# Patient Record
Sex: MALE | Race: WHITE | Employment: PART TIME | URBAN - METROPOLITAN AREA
[De-identification: names, ages, dates, MRNs, and addresses within clinical notes are randomized per-mention and may not be internally consistent; named-entity substitution may affect disease eponyms.]

---

## 2021-04-22 ENCOUNTER — APPOINTMENT (OUTPATIENT)
Dept: CT IMAGING | Age: 56
DRG: 065 | End: 2021-04-22

## 2021-04-22 ENCOUNTER — HOSPITAL ENCOUNTER (INPATIENT)
Age: 56
LOS: 1 days | Discharge: HOME | DRG: 065 | End: 2021-04-23
Attending: EMERGENCY MEDICINE | Admitting: INTERNAL MEDICINE

## 2021-04-22 ENCOUNTER — APPOINTMENT (OUTPATIENT)
Dept: MRI IMAGING | Age: 56
DRG: 065 | End: 2021-04-22

## 2021-04-22 ENCOUNTER — APPOINTMENT (OUTPATIENT)
Dept: GENERAL RADIOLOGY | Age: 56
DRG: 065 | End: 2021-04-22

## 2021-04-22 DIAGNOSIS — U07.1 CEREBROVASCULAR ACCIDENT (CVA) ASSOCIATED WITH SEVERE ACUTE RESPIRATORY SYNDROME CORONAVIRUS 2 (SARS-COV-2) INFECTION (HCC): ICD-10-CM

## 2021-04-22 DIAGNOSIS — I63.9 CEREBROVASCULAR ACCIDENT (CVA) ASSOCIATED WITH SEVERE ACUTE RESPIRATORY SYNDROME CORONAVIRUS 2 (SARS-COV-2) INFECTION (HCC): ICD-10-CM

## 2021-04-22 DIAGNOSIS — G45.9 TIA (TRANSIENT ISCHEMIC ATTACK): Primary | ICD-10-CM

## 2021-04-22 DIAGNOSIS — E04.1 THYROID NODULE: ICD-10-CM

## 2021-04-22 DIAGNOSIS — I16.1 HYPERTENSIVE EMERGENCY: ICD-10-CM

## 2021-04-22 DIAGNOSIS — M10.9 GOUTY ARTHRITIS OF RIGHT ANKLE: ICD-10-CM

## 2021-04-22 PROBLEM — I10 HTN (HYPERTENSION): Status: ACTIVE | Noted: 2021-04-22

## 2021-04-22 LAB
A/G RATIO: 1.2 (ref 1.1–2.2)
ALBUMIN SERPL-MCNC: 4.3 G/DL (ref 3.4–5)
ALP BLD-CCNC: 81 U/L (ref 40–129)
ALT SERPL-CCNC: 16 U/L (ref 10–40)
AMPHETAMINE SCREEN, URINE: NORMAL
ANION GAP SERPL CALCULATED.3IONS-SCNC: 10 MMOL/L (ref 3–16)
AST SERPL-CCNC: 13 U/L (ref 15–37)
BARBITURATE SCREEN URINE: NORMAL
BASOPHILS ABSOLUTE: 0 K/UL (ref 0–0.2)
BASOPHILS RELATIVE PERCENT: 0.5 %
BENZODIAZEPINE SCREEN, URINE: NORMAL
BILIRUB SERPL-MCNC: 0.6 MG/DL (ref 0–1)
BILIRUBIN URINE: NEGATIVE
BLOOD, URINE: NEGATIVE
BUN BLDV-MCNC: 11 MG/DL (ref 7–20)
CALCIUM SERPL-MCNC: 9.2 MG/DL (ref 8.3–10.6)
CANNABINOID SCREEN URINE: NORMAL
CHLORIDE BLD-SCNC: 104 MMOL/L (ref 99–110)
CLARITY: CLEAR
CO2: 27 MMOL/L (ref 21–32)
COCAINE METABOLITE SCREEN URINE: NORMAL
COLOR: YELLOW
CREAT SERPL-MCNC: 1 MG/DL (ref 0.9–1.3)
EOSINOPHILS ABSOLUTE: 0.1 K/UL (ref 0–0.6)
EOSINOPHILS RELATIVE PERCENT: 0.8 %
EPITHELIAL CELLS, UA: 0 /HPF (ref 0–5)
GFR AFRICAN AMERICAN: >60
GFR NON-AFRICAN AMERICAN: >60
GLOBULIN: 3.6 G/DL
GLUCOSE BLD-MCNC: 100 MG/DL (ref 70–99)
GLUCOSE BLD-MCNC: 107 MG/DL (ref 70–99)
GLUCOSE URINE: NEGATIVE MG/DL
HCT VFR BLD CALC: 43.6 % (ref 40.5–52.5)
HEMOGLOBIN: 14.7 G/DL (ref 13.5–17.5)
HYALINE CASTS: 1 /LPF (ref 0–8)
INR BLD: 0.99 (ref 0.86–1.14)
KETONES, URINE: NEGATIVE MG/DL
LEUKOCYTE ESTERASE, URINE: NEGATIVE
LYMPHOCYTES ABSOLUTE: 2.3 K/UL (ref 1–5.1)
LYMPHOCYTES RELATIVE PERCENT: 24 %
Lab: NORMAL
MCH RBC QN AUTO: 28.9 PG (ref 26–34)
MCHC RBC AUTO-ENTMCNC: 33.8 G/DL (ref 31–36)
MCV RBC AUTO: 85.4 FL (ref 80–100)
METHADONE SCREEN, URINE: NORMAL
MICROSCOPIC EXAMINATION: NORMAL
MONOCYTES ABSOLUTE: 0.7 K/UL (ref 0–1.3)
MONOCYTES RELATIVE PERCENT: 7.3 %
NEUTROPHILS ABSOLUTE: 6.4 K/UL (ref 1.7–7.7)
NEUTROPHILS RELATIVE PERCENT: 67.4 %
NITRITE, URINE: NEGATIVE
OPIATE SCREEN URINE: NORMAL
OXYCODONE URINE: NORMAL
PDW BLD-RTO: 14.2 % (ref 12.4–15.4)
PERFORMED ON: ABNORMAL
PH UA: 6
PH UA: 7 (ref 5–8)
PHENCYCLIDINE SCREEN URINE: NORMAL
PLATELET # BLD: 309 K/UL (ref 135–450)
PMV BLD AUTO: 7.5 FL (ref 5–10.5)
POTASSIUM REFLEX MAGNESIUM: 3.9 MMOL/L (ref 3.5–5.1)
PROPOXYPHENE SCREEN: NORMAL
PROTEIN UA: NEGATIVE MG/DL
PROTHROMBIN TIME: 11.5 SEC (ref 10–13.2)
RBC # BLD: 5.1 M/UL (ref 4.2–5.9)
RBC UA: 1 /HPF (ref 0–4)
SODIUM BLD-SCNC: 141 MMOL/L (ref 136–145)
SPECIFIC GRAVITY UA: 1.02 (ref 1–1.03)
TOTAL PROTEIN: 7.9 G/DL (ref 6.4–8.2)
TROPONIN: <0.01 NG/ML
TSH REFLEX: 4.14 UIU/ML (ref 0.27–4.2)
URINE TYPE: NORMAL
UROBILINOGEN, URINE: 0.2 E.U./DL
WBC # BLD: 9.5 K/UL (ref 4–11)
WBC UA: 0 /HPF (ref 0–5)

## 2021-04-22 PROCEDURE — 81001 URINALYSIS AUTO W/SCOPE: CPT

## 2021-04-22 PROCEDURE — 93005 ELECTROCARDIOGRAM TRACING: CPT | Performed by: EMERGENCY MEDICINE

## 2021-04-22 PROCEDURE — 2500000003 HC RX 250 WO HCPCS: Performed by: INTERNAL MEDICINE

## 2021-04-22 PROCEDURE — 85610 PROTHROMBIN TIME: CPT

## 2021-04-22 PROCEDURE — G0378 HOSPITAL OBSERVATION PER HR: HCPCS

## 2021-04-22 PROCEDURE — 99285 EMERGENCY DEPT VISIT HI MDM: CPT

## 2021-04-22 PROCEDURE — 70496 CT ANGIOGRAPHY HEAD: CPT

## 2021-04-22 PROCEDURE — 80307 DRUG TEST PRSMV CHEM ANLYZR: CPT

## 2021-04-22 PROCEDURE — 84484 ASSAY OF TROPONIN QUANT: CPT

## 2021-04-22 PROCEDURE — 6360000004 HC RX CONTRAST MEDICATION: Performed by: EMERGENCY MEDICINE

## 2021-04-22 PROCEDURE — 80053 COMPREHEN METABOLIC PANEL: CPT

## 2021-04-22 PROCEDURE — 6360000002 HC RX W HCPCS: Performed by: EMERGENCY MEDICINE

## 2021-04-22 PROCEDURE — 96374 THER/PROPH/DIAG INJ IV PUSH: CPT

## 2021-04-22 PROCEDURE — 71045 X-RAY EXAM CHEST 1 VIEW: CPT

## 2021-04-22 PROCEDURE — 2500000003 HC RX 250 WO HCPCS: Performed by: EMERGENCY MEDICINE

## 2021-04-22 PROCEDURE — 6370000000 HC RX 637 (ALT 250 FOR IP): Performed by: EMERGENCY MEDICINE

## 2021-04-22 PROCEDURE — 84443 ASSAY THYROID STIM HORMONE: CPT

## 2021-04-22 PROCEDURE — 2580000003 HC RX 258: Performed by: INTERNAL MEDICINE

## 2021-04-22 PROCEDURE — 70551 MRI BRAIN STEM W/O DYE: CPT

## 2021-04-22 PROCEDURE — 6360000002 HC RX W HCPCS: Performed by: INTERNAL MEDICINE

## 2021-04-22 PROCEDURE — 70450 CT HEAD/BRAIN W/O DYE: CPT

## 2021-04-22 PROCEDURE — 6370000000 HC RX 637 (ALT 250 FOR IP): Performed by: INTERNAL MEDICINE

## 2021-04-22 PROCEDURE — 6370000000 HC RX 637 (ALT 250 FOR IP): Performed by: NURSE PRACTITIONER

## 2021-04-22 PROCEDURE — 94760 N-INVAS EAR/PLS OXIMETRY 1: CPT

## 2021-04-22 PROCEDURE — 85025 COMPLETE CBC W/AUTO DIFF WBC: CPT

## 2021-04-22 PROCEDURE — 36415 COLL VENOUS BLD VENIPUNCTURE: CPT

## 2021-04-22 RX ORDER — SODIUM CHLORIDE 9 MG/ML
25 INJECTION, SOLUTION INTRAVENOUS PRN
Status: DISCONTINUED | OUTPATIENT
Start: 2021-04-22 | End: 2021-04-23 | Stop reason: HOSPADM

## 2021-04-22 RX ORDER — LABETALOL HYDROCHLORIDE 5 MG/ML
10 INJECTION, SOLUTION INTRAVENOUS EVERY 4 HOURS PRN
Status: DISCONTINUED | OUTPATIENT
Start: 2021-04-22 | End: 2021-04-23 | Stop reason: HOSPADM

## 2021-04-22 RX ORDER — ONDANSETRON 2 MG/ML
4 INJECTION INTRAMUSCULAR; INTRAVENOUS EVERY 6 HOURS PRN
Status: DISCONTINUED | OUTPATIENT
Start: 2021-04-22 | End: 2021-04-23 | Stop reason: HOSPADM

## 2021-04-22 RX ORDER — ATORVASTATIN CALCIUM 80 MG/1
80 TABLET, FILM COATED ORAL NIGHTLY
Status: DISCONTINUED | OUTPATIENT
Start: 2021-04-22 | End: 2021-04-23 | Stop reason: HOSPADM

## 2021-04-22 RX ORDER — AMLODIPINE BESYLATE 5 MG/1
5 TABLET ORAL DAILY
Status: DISCONTINUED | OUTPATIENT
Start: 2021-04-22 | End: 2021-04-23 | Stop reason: HOSPADM

## 2021-04-22 RX ORDER — LABETALOL HYDROCHLORIDE 5 MG/ML
10 INJECTION, SOLUTION INTRAVENOUS EVERY 10 MIN PRN
Status: COMPLETED | OUTPATIENT
Start: 2021-04-22 | End: 2021-04-22

## 2021-04-22 RX ORDER — ASPIRIN 300 MG/1
300 SUPPOSITORY RECTAL DAILY
Status: DISCONTINUED | OUTPATIENT
Start: 2021-04-22 | End: 2021-04-23 | Stop reason: HOSPADM

## 2021-04-22 RX ORDER — ASPIRIN 81 MG/1
81 TABLET ORAL DAILY
Status: DISCONTINUED | OUTPATIENT
Start: 2021-04-22 | End: 2021-04-23 | Stop reason: HOSPADM

## 2021-04-22 RX ORDER — POLYETHYLENE GLYCOL 3350 17 G/17G
17 POWDER, FOR SOLUTION ORAL DAILY PRN
Status: DISCONTINUED | OUTPATIENT
Start: 2021-04-22 | End: 2021-04-23 | Stop reason: HOSPADM

## 2021-04-22 RX ORDER — HYDROCHLOROTHIAZIDE 25 MG/1
25 TABLET ORAL DAILY
Status: DISCONTINUED | OUTPATIENT
Start: 2021-04-22 | End: 2021-04-23 | Stop reason: HOSPADM

## 2021-04-22 RX ORDER — ASPIRIN 81 MG/1
81 TABLET, CHEWABLE ORAL ONCE
Status: COMPLETED | OUTPATIENT
Start: 2021-04-22 | End: 2021-04-22

## 2021-04-22 RX ORDER — INDOMETHACIN 25 MG/1
50 CAPSULE ORAL 2 TIMES DAILY WITH MEALS
Status: DISCONTINUED | OUTPATIENT
Start: 2021-04-22 | End: 2021-04-23 | Stop reason: HOSPADM

## 2021-04-22 RX ORDER — ALLOPURINOL 100 MG/1
100 TABLET ORAL 2 TIMES DAILY
Status: DISCONTINUED | OUTPATIENT
Start: 2021-04-22 | End: 2021-04-23 | Stop reason: HOSPADM

## 2021-04-22 RX ORDER — SODIUM CHLORIDE 0.9 % (FLUSH) 0.9 %
5-40 SYRINGE (ML) INJECTION EVERY 12 HOURS SCHEDULED
Status: DISCONTINUED | OUTPATIENT
Start: 2021-04-22 | End: 2021-04-23 | Stop reason: HOSPADM

## 2021-04-22 RX ORDER — PROMETHAZINE HYDROCHLORIDE 25 MG/1
12.5 TABLET ORAL EVERY 6 HOURS PRN
Status: DISCONTINUED | OUTPATIENT
Start: 2021-04-22 | End: 2021-04-23 | Stop reason: HOSPADM

## 2021-04-22 RX ORDER — SODIUM CHLORIDE 0.9 % (FLUSH) 0.9 %
5-40 SYRINGE (ML) INJECTION PRN
Status: DISCONTINUED | OUTPATIENT
Start: 2021-04-22 | End: 2021-04-23 | Stop reason: HOSPADM

## 2021-04-22 RX ORDER — LANOLIN ALCOHOL/MO/W.PET/CERES
6 CREAM (GRAM) TOPICAL NIGHTLY PRN
Status: DISCONTINUED | OUTPATIENT
Start: 2021-04-22 | End: 2021-04-23 | Stop reason: HOSPADM

## 2021-04-22 RX ORDER — METHYLPREDNISOLONE SODIUM SUCCINATE 40 MG/ML
40 INJECTION, POWDER, LYOPHILIZED, FOR SOLUTION INTRAMUSCULAR; INTRAVENOUS ONCE
Status: COMPLETED | OUTPATIENT
Start: 2021-04-22 | End: 2021-04-22

## 2021-04-22 RX ADMIN — Medication 6 MG: at 23:50

## 2021-04-22 RX ADMIN — METHYLPREDNISOLONE SODIUM SUCCINATE 40 MG: 40 INJECTION, POWDER, FOR SOLUTION INTRAMUSCULAR; INTRAVENOUS at 11:42

## 2021-04-22 RX ADMIN — AMLODIPINE BESYLATE 5 MG: 5 TABLET ORAL at 13:18

## 2021-04-22 RX ADMIN — LABETALOL HYDROCHLORIDE 10 MG: 5 INJECTION INTRAVENOUS at 20:59

## 2021-04-22 RX ADMIN — LABETALOL HYDROCHLORIDE 10 MG: 5 INJECTION, SOLUTION INTRAVENOUS at 10:26

## 2021-04-22 RX ADMIN — ATORVASTATIN CALCIUM 80 MG: 80 TABLET, FILM COATED ORAL at 20:59

## 2021-04-22 RX ADMIN — ENOXAPARIN SODIUM 40 MG: 40 INJECTION SUBCUTANEOUS at 13:19

## 2021-04-22 RX ADMIN — IOPAMIDOL 75 ML: 755 INJECTION, SOLUTION INTRAVENOUS at 09:37

## 2021-04-22 RX ADMIN — HYDROCHLOROTHIAZIDE 25 MG: 25 TABLET ORAL at 13:18

## 2021-04-22 RX ADMIN — ALLOPURINOL 100 MG: 100 TABLET ORAL at 20:59

## 2021-04-22 RX ADMIN — ASPIRIN 81 MG: 81 TABLET, CHEWABLE ORAL at 11:41

## 2021-04-22 RX ADMIN — LABETALOL HYDROCHLORIDE 10 MG: 5 INJECTION, SOLUTION INTRAVENOUS at 10:48

## 2021-04-22 RX ADMIN — INDOMETHACIN 50 MG: 25 CAPSULE ORAL at 15:02

## 2021-04-22 RX ADMIN — Medication 10 ML: at 21:04

## 2021-04-22 RX ADMIN — ALLOPURINOL 100 MG: 100 TABLET ORAL at 15:02

## 2021-04-22 ASSESSMENT — PAIN DESCRIPTION - LOCATION: LOCATION: FOOT

## 2021-04-22 ASSESSMENT — PAIN DESCRIPTION - ORIENTATION: ORIENTATION: RIGHT;LEFT

## 2021-04-22 ASSESSMENT — PAIN DESCRIPTION - PROGRESSION: CLINICAL_PROGRESSION: GRADUALLY IMPROVING

## 2021-04-22 ASSESSMENT — PAIN SCALES - GENERAL
PAINLEVEL_OUTOF10: 0
PAINLEVEL_OUTOF10: 4
PAINLEVEL_OUTOF10: 0

## 2021-04-22 ASSESSMENT — PAIN DESCRIPTION - PAIN TYPE: TYPE: CHRONIC PAIN

## 2021-04-22 ASSESSMENT — PAIN - FUNCTIONAL ASSESSMENT: PAIN_FUNCTIONAL_ASSESSMENT: PREVENTS OR INTERFERES SOME ACTIVE ACTIVITIES AND ADLS

## 2021-04-22 ASSESSMENT — PAIN DESCRIPTION - DESCRIPTORS: DESCRIPTORS: THROBBING;ACHING;CONSTANT

## 2021-04-22 ASSESSMENT — PAIN DESCRIPTION - FREQUENCY: FREQUENCY: CONTINUOUS

## 2021-04-22 NOTE — ED PROVIDER NOTES
CHIEF COMPLAINT  Fatigue (PT c/o generalized weakness and tingling in his left arm since 0745 am. ) and Numbness      HISTORY OF PRESENT ILLNESS  Anna Greenberg is a 55 y.o. male presents to the ED with left side numbness. The patient states it he was in the shower at 745 and he felt really unwell, his left arm was numb and felt heavy. He got dressed, got his kids to school and then came for help. The numbness seems to have gone away, but he still feels somewhat unwell. This is not happened to him before. He has had high blood pressure many times in the past but is untreated. He also notes he is having a gout flare in his right ankle for the last couple of days is been treating that with anti-inflammatories. .  No other complaints, modifying factors or associated symptoms. I have reviewed the following from the nursing documentation. No past medical history on file. No past surgical history on file. No family history on file.   Social History     Socioeconomic History    Marital status: Not on file     Spouse name: Not on file    Number of children: Not on file    Years of education: Not on file    Highest education level: Not on file   Occupational History    Not on file   Social Needs    Financial resource strain: Not on file    Food insecurity     Worry: Not on file     Inability: Not on file    Transportation needs     Medical: Not on file     Non-medical: Not on file   Tobacco Use    Smoking status: Not on file   Substance and Sexual Activity    Alcohol use: Not on file    Drug use: Not on file    Sexual activity: Not on file   Lifestyle    Physical activity     Days per week: Not on file     Minutes per session: Not on file    Stress: Not on file   Relationships    Social connections     Talks on phone: Not on file     Gets together: Not on file     Attends [employer] service: Not on file     Active member of club or organization: Not on file     Attends meetings of clubs or with a rate of 94  Axis is   Left axis deviation  QTc is  484  Intervals and Durations are unremarkable. ST Segments: normal  No prior EKGs      RADIOLOGY  Ct Head Wo Contrast    Result Date: 4/22/2021  EXAMINATION: CT OF THE HEAD WITHOUT CONTRAST  4/22/2021 9:34 am TECHNIQUE: CT of the head was performed without the administration of intravenous contrast. Dose modulation, iterative reconstruction, and/or weight based adjustment of the mA/kV was utilized to reduce the radiation dose to as low as reasonably achievable. COMPARISON: None. HISTORY: ORDERING SYSTEM PROVIDED HISTORY: left sided numbness TECHNOLOGIST PROVIDED HISTORY: Has a \"code stroke\" or \"stroke alert\" been called? ->Yes Reason for exam:->left sided numbness Decision Support Exception->Emergency Medical Condition (MA) Reason for Exam: dizziness Acuity: Acute Type of Exam: Initial FINDINGS: BRAIN/VENTRICLES: There is no acute intracranial hemorrhage, mass effect or midline shift. No abnormal extra-axial fluid collection. There mild periventricular white matter hypodensities. The gray-white differentiation is maintained without evidence of an acute infarct. There is no evidence of hydrocephalus. ORBITS: The visualized portion of the orbits demonstrate no acute abnormality. SINUSES: The visualized paranasal sinuses and mastoid air cells demonstrate no acute abnormality. SOFT TISSUES/SKULL: Multiple soft tissue nodules are seen, some which contain some calcifications. No acute abnormality of the visualized skull or soft tissues. No acute intracranial abnormality. Mild white matter changes consistent with chronic small vessel ischemic disease. Multiple subcutaneous nodules are seen, some which calcifications. While these are nonspecific and may be incidental, neurofibromatosis is not excluded in the correct clinical setting. Findings were discussed with Yazmin Cabrera at 9:49 am on 4/22/2021.      Xr Chest Portable    Result Date: 4/22/2021  EXAMINATION: ONE XRAY VIEW OF THE CHEST 4/22/2021 9:47 am COMPARISON: None available. HISTORY: ORDERING SYSTEM PROVIDED HISTORY: stroke TECHNOLOGIST PROVIDED HISTORY: Reason for exam:->stroke Reason for Exam: stroke Acuity: Acute Type of Exam: Initial FINDINGS: There is left basilar atelectasis. The cardiac silhouette is within normal limits. There is no pneumothorax or pleural effusion. 1.  No acute abnormality. Cta Head Neck W Contrast    Result Date: 4/22/2021  EXAMINATION: CTA OF THE HEAD AND NECK WITH CONTRAST 4/22/2021 9:35 am: TECHNIQUE: CTA of the head and neck was performed with the administration of intravenous contrast. Multiplanar reformatted images are provided for review. MIP images are provided for review. Stenosis of the internal carotid arteries measured using NASCET criteria. Dose modulation, iterative reconstruction, and/or weight based adjustment of the mA/kV was utilized to reduce the radiation dose to as low as reasonably achievable. COMPARISON: None. HISTORY: ORDERING SYSTEM PROVIDED HISTORY: left side numbness TECHNOLOGIST PROVIDED HISTORY: Reason for exam:->left side numbness Initial evaluation. FINDINGS: CTA NECK: AORTIC ARCH/ARCH VESSELS: There is a normal 3 vessel arch configuration. No focal stenosis of the innominate or subclavian arteries. CAROTID ARTERIES: No focal stenosis of the common carotid arteries. There appears to be noncalcified atherosclerosis involving the proximal cervical ICAs, right greater than left. No flow limiting stenosis of the internal carotid arteries by NASCET criteria. There is a retropharyngeal course of the left internal carotid artery. VERTEBRAL ARTERIES: No focal stenosis is seen of the vertebral arteries. SOFT TISSUES: No focal consolidation within the visualized lung apices. No acute abnormality within the visualized superior mediastinum. There is heterogeneity of the thyroid gland bilaterally, which is slightly enlarged.  There

## 2021-04-22 NOTE — H&P
Pulse 84   Temp 98.2 °F (36.8 °C) (Oral)   Resp 16   Ht 5' 11\" (1.803 m)   Wt 251 lb 5.2 oz (114 kg)   SpO2 95%   BMI 35.05 kg/m²     General appearance: No apparent distress appears stated age and cooperative. HEENT Normal cephalic, atraumatic without obvious deformity. Pupils equal, round, and reactive to light. Extra ocular muscles intact. Conjunctivae/corneas clear. Neck: Supple, No jugular venous distention/bruits. Trachea midline without thyromegaly or adenopathy with full range of motion. Lungs: Clear to auscultation, bilaterally without Rales/Wheezes/Rhonchi with good respiratory effort. Heart: Regular rate and rhythm with Normal S1/S2 without murmurs, rubs or gallops, point of maximum impulse non-displaced  Abdomen: Soft, non-tender or non-distended without rigidity or guarding and positive bowel sounds all four quadrants. Extremities: No clubbing, cyanosis, or edema bilaterally. Full range of motion without deformity and normal gait intact. Skin: Skin color, texture, turgor normal.  No rashes or lesions. Neurologic: Alert and oriented X 3, neurovascularly intact with sensory/motor intact upper extremities/lower extremities, bilaterally. Cranial nerves: II-XII intact, grossly non-focal.  Mental status: Alert, oriented, thought content appropriate.   Capillary Refill: Acceptable  < 3 seconds  Peripheral Pulses: +3 Easily felt, not easily obliterated with pressure      CBC   Recent Labs     04/22/21  0931   WBC 9.5   HGB 14.7   HCT 43.6         RENAL  Recent Labs     04/22/21  0931      K 3.9      CO2 27   BUN 11   CREATININE 1.0     LFT'S  Recent Labs     04/22/21  0931   AST 13*   ALT 16   BILITOT 0.6   ALKPHOS 81     COAG  Recent Labs     04/22/21  0931   INR 0.99     CARDIAC ENZYMES  Recent Labs     04/22/21  0931   TROPONINI <0.01       U/A:    Lab Results   Component Value Date    COLORU YELLOW 04/22/2021    CLARITYU Clear 04/22/2021    SPECGRAV 1.023 04/22/2021

## 2021-04-22 NOTE — CARE COORDINATION
INITIAL CASE MANAGEMENT ASSESSMENT    Reviewed chart, met with patient to assess possible discharge needs. Explained Case Management role/services. Living Situation: Lives in a house with 2 VALERY with his children who are 12 and 5, his wife  3 yrs ago, patient concerned on how his children will handle him being in the hospital.  Patient has older children 27 & 29 that will assist with children     ADLs: Independent     DME: None    PT/OT Recs: TBD     Active Services: None     Transportation: active , his car is in the parking lot. Medications: Steven    PCP: had PCP in [place]?, hasn't seen       HD/PD: N/A    PLAN/COMMENTS: Patient is concerned that he doesn't have insurance. Has not seen PCP due to no insurance. May not be able to afford medications, DANIELA provided information on [address]( pt may need voucher at discharge)-   Patient unsure if he can see his PCP- stated last visit was $500.00. He can not afford that. DANIELA called [phone]  Counselor  [phone])requested that she see patient. SW/CM provided contact information for patient or family to call with any questions. SW/CM will follow and assist as needed.     Advanced Care Planning Complered

## 2021-04-22 NOTE — ED NOTES
Provider notified of pt's persistent elevated BP. No new orders at this time.       Wojciech Braxton, RN  04/22/21 [address] [facility] [employer] Matti, RN  04/22/21 [id number]

## 2021-04-22 NOTE — ACP (ADVANCE CARE PLANNING)
Advance Care Planning     Advance Care Planning Activator (Inpatient)  Conversation Note      Date of ACP Conversation: 4/22/2021    Conversation Conducted with: Patient with Decision Making Capacity    ACP Activator: Nneka Barton Maker:     Current Designated Health Care Decision Maker:     Primary Decision Maker: Kayla [facility] - [phone]    Secondary Decision Maker: Raf Krishna Child - [phone]    Today we documented Decision Maker(s) consistent with Legal Next of Kin hierarchy. Care Preferences    Ventilation: \"If you were in your present state of health and suddenly became very ill and were unable to breathe on your own, what would your preference be about the use of a ventilator (breathing machine) if it were available to you? \"      Would the patient desire the use of ventilator (breathing machine)?: yes    \"If your health worsens and it becomes clear that your chance of recovery is unlikely, what would your preference be about the use of a ventilator (breathing machine) if it were available to you? \"     Would the patient desire the use of ventilator (breathing machine)?: Yes      Resuscitation  \"CPR works best to restart the heart when there is a sudden event, like a heart attack, in someone who is otherwise healthy. Unfortunately, CPR does not typically restart the heart for people who have serious health conditions or who are very sick. \"    \"In the event your heart stopped as a result of an underlying serious health condition, would you want attempts to be made to restart your heart (answer \"yes\" for attempt to resuscitate) or would you prefer a natural death (answer \"no\" for do not attempt to resuscitate)? \" yes       [x] Yes   [] No   Educated Patient / Francheska King regarding differences between Advance Directives and portable DNR orders.     Length of ACP Conversation in minutes:  10    Conversation Outcomes:  [x] ACP discussion completed  [] Existing advance directive reviewed with patient; no changes to patient's previously recorded wishes  [] New Advance Directive completed  [] Portable Do Not Rescitate prepared for Provider review and signature  [] POLST/POST/MOLST/MOST prepared for Provider review and signature      Follow-up plan:    [] Schedule follow-up conversation to continue planning  [] Referred individual to Provider for additional questions/concerns   [] Advised patient/agent/surrogate to review completed ACP document and update if needed with changes in condition, patient preferences or care setting    [] This note routed to one or more involved healthcare providers

## 2021-04-22 NOTE — PROGRESS NOTES
Medication Reconciliation    List of medications for Claudia Fulton is currently taking is complete. Source of information:   [employer] with patient, without prompting     Allergies  Patient has no known allergies.      Notes regarding home medications:   Patient denies the use of any routine herbal, OTC, or prescription medications  Will occasionally take aspirin for gout symptoms      Flako Norris, PharmKEELY   4/22/2021 11:48 AM

## 2021-04-22 NOTE — ED NOTES
Report called to RN assuming care. Pain score reviewed and all questions answered. Will take pt upstairs and go over [facility] at bedside.       Teja Posada RN  04/22/21 7317

## 2021-04-22 NOTE — ED TRIAGE NOTES
Pt arrived to dept via private vehicle. Pt c/o generalized weakness/dizziness with left arm tingling starting at 0745 this morning. Pt reports that the left arm tingling is no longer present. Pt awake, alert and oriented x 3. Skin warm and dry/normal color for ethnicity. Resp easy and unlabored. Pt placed in gown and on cardiac monitor. Call light in reach. Will continue to monitor.

## 2021-04-23 VITALS
SYSTOLIC BLOOD PRESSURE: 162 MMHG | DIASTOLIC BLOOD PRESSURE: 88 MMHG | WEIGHT: 245.59 LBS | HEIGHT: 71 IN | OXYGEN SATURATION: 94 % | HEART RATE: 92 BPM | BODY MASS INDEX: 34.38 KG/M2 | RESPIRATION RATE: 16 BRPM | TEMPERATURE: 98 F

## 2021-04-23 PROBLEM — I63.9 CEREBROVASCULAR ACCIDENT (CVA) ASSOCIATED WITH SEVERE ACUTE RESPIRATORY SYNDROME CORONAVIRUS 2 (SARS-COV-2) INFECTION (HCC): Status: ACTIVE | Noted: 2021-04-23

## 2021-04-23 PROBLEM — U07.1 CEREBROVASCULAR ACCIDENT (CVA) ASSOCIATED WITH SEVERE ACUTE RESPIRATORY SYNDROME CORONAVIRUS 2 (SARS-COV-2) INFECTION (HCC): Status: ACTIVE | Noted: 2021-04-23

## 2021-04-23 LAB
CHOLESTEROL, TOTAL: 193 MG/DL (ref 0–199)
EKG ATRIAL RATE: 94 BPM
EKG DIAGNOSIS: NORMAL
EKG P AXIS: 41 DEGREES
EKG P-R INTERVAL: 148 MS
EKG Q-T INTERVAL: 390 MS
EKG QRS DURATION: 98 MS
EKG QTC CALCULATION (BAZETT): 487 MS
EKG R AXIS: -40 DEGREES
EKG T AXIS: 43 DEGREES
EKG VENTRICULAR RATE: 94 BPM
ESTIMATED AVERAGE GLUCOSE: 105.4 MG/DL
HBA1C MFR BLD: 5.3 %
HCT VFR BLD CALC: 42 % (ref 40.5–52.5)
HDLC SERPL-MCNC: 36 MG/DL (ref 40–60)
HEMOGLOBIN: 14.6 G/DL (ref 13.5–17.5)
LDL CHOLESTEROL CALCULATED: 138 MG/DL
LV EF: 53 %
LVEF MODALITY: NORMAL
MCH RBC QN AUTO: 29.3 PG (ref 26–34)
MCHC RBC AUTO-ENTMCNC: 34.8 G/DL (ref 31–36)
MCV RBC AUTO: 84.3 FL (ref 80–100)
PDW BLD-RTO: 14 % (ref 12.4–15.4)
PLATELET # BLD: 318 K/UL (ref 135–450)
PMV BLD AUTO: 7.1 FL (ref 5–10.5)
RBC # BLD: 4.98 M/UL (ref 4.2–5.9)
TRIGL SERPL-MCNC: 93 MG/DL (ref 0–150)
VLDLC SERPL CALC-MCNC: 19 MG/DL
WBC # BLD: 11.3 K/UL (ref 4–11)

## 2021-04-23 PROCEDURE — 94760 N-INVAS EAR/PLS OXIMETRY 1: CPT

## 2021-04-23 PROCEDURE — 85027 COMPLETE CBC AUTOMATED: CPT

## 2021-04-23 PROCEDURE — 6370000000 HC RX 637 (ALT 250 FOR IP): Performed by: INTERNAL MEDICINE

## 2021-04-23 PROCEDURE — 83036 HEMOGLOBIN GLYCOSYLATED A1C: CPT

## 2021-04-23 PROCEDURE — 36415 COLL VENOUS BLD VENIPUNCTURE: CPT

## 2021-04-23 PROCEDURE — 6360000002 HC RX W HCPCS: Performed by: INTERNAL MEDICINE

## 2021-04-23 PROCEDURE — 2060000000 HC ICU INTERMEDIATE R&B

## 2021-04-23 PROCEDURE — 2580000003 HC RX 258: Performed by: INTERNAL MEDICINE

## 2021-04-23 PROCEDURE — 80061 LIPID PANEL: CPT

## 2021-04-23 PROCEDURE — 93010 ELECTROCARDIOGRAM REPORT: CPT | Performed by: INTERNAL MEDICINE

## 2021-04-23 PROCEDURE — 93306 TTE W/DOPPLER COMPLETE: CPT

## 2021-04-23 RX ORDER — ALLOPURINOL 100 MG/1
100 TABLET ORAL DAILY
Qty: 30 TABLET | Refills: 3 | Status: SHIPPED | OUTPATIENT
Start: 2021-04-23 | End: 2021-11-05 | Stop reason: SDUPTHER

## 2021-04-23 RX ORDER — CLOPIDOGREL BISULFATE 75 MG/1
75 TABLET ORAL DAILY
Qty: 30 TABLET | Refills: 3 | Status: SHIPPED | OUTPATIENT
Start: 2021-04-23 | End: 2021-11-05 | Stop reason: SDUPTHER

## 2021-04-23 RX ORDER — HYDROCHLOROTHIAZIDE 25 MG/1
25 TABLET ORAL DAILY
Qty: 30 TABLET | Refills: 3 | Status: SHIPPED | OUTPATIENT
Start: 2021-04-24 | End: 2021-11-05 | Stop reason: SDUPTHER

## 2021-04-23 RX ORDER — ASPIRIN 81 MG/1
81 TABLET ORAL DAILY
Qty: 30 TABLET | Refills: 0 | Status: SHIPPED | OUTPATIENT
Start: 2021-04-24 | End: 2021-11-05 | Stop reason: ALTCHOICE

## 2021-04-23 RX ORDER — ATORVASTATIN CALCIUM 80 MG/1
80 TABLET, FILM COATED ORAL NIGHTLY
Qty: 30 TABLET | Refills: 3 | Status: SHIPPED | OUTPATIENT
Start: 2021-04-23 | End: 2021-11-05 | Stop reason: SDUPTHER

## 2021-04-23 RX ORDER — AMLODIPINE BESYLATE 5 MG/1
5 TABLET ORAL DAILY
Qty: 30 TABLET | Refills: 3 | Status: SHIPPED | OUTPATIENT
Start: 2021-04-24 | End: 2021-11-05 | Stop reason: SDUPTHER

## 2021-04-23 RX ORDER — INDOMETHACIN 50 MG/1
50 CAPSULE ORAL 2 TIMES DAILY WITH MEALS
Qty: 20 CAPSULE | Refills: 1 | Status: SHIPPED | OUTPATIENT
Start: 2021-04-23 | End: 2021-11-05 | Stop reason: ALTCHOICE

## 2021-04-23 RX ADMIN — Medication 10 ML: at 09:28

## 2021-04-23 RX ADMIN — ASPIRIN 81 MG: 81 TABLET, COATED ORAL at 09:26

## 2021-04-23 RX ADMIN — HYDROCHLOROTHIAZIDE 25 MG: 25 TABLET ORAL at 09:26

## 2021-04-23 RX ADMIN — AMLODIPINE BESYLATE 5 MG: 5 TABLET ORAL at 09:26

## 2021-04-23 RX ADMIN — INDOMETHACIN 50 MG: 25 CAPSULE ORAL at 09:26

## 2021-04-23 RX ADMIN — ALLOPURINOL 100 MG: 100 TABLET ORAL at 09:26

## 2021-04-23 RX ADMIN — ENOXAPARIN SODIUM 40 MG: 40 INJECTION SUBCUTANEOUS at 09:27

## 2021-04-23 ASSESSMENT — PAIN SCALES - GENERAL: PAINLEVEL_OUTOF10: 0

## 2021-04-23 NOTE — PLAN OF CARE
Problem: Falls - Risk of:  Goal: Will remain free from falls  Description: Will remain free from falls  4/23/2021 1115 by William Quiñones RN  Outcome: Ongoing  Note: Fall precautions in place. Bed locked and in lowest position. Call light and belongings within reach. Room kept free of clutter.    4/23/2021 0116 by Jessica Martines RN  Outcome: Ongoing  Goal: Absence of physical injury  Description: Absence of physical injury  4/23/2021 1115 by William Quiñones RN  Outcome: Ongoing  4/23/2021 0116 by Jessica Martines RN  Outcome: Ongoing     Problem: HEMODYNAMIC STATUS  Goal: Patient has stable vital signs and fluid balance  4/23/2021 1115 by William Quiñones RN  Outcome: Ongoing  4/23/2021 0116 by Jessica Martines RN  Outcome: Ongoing     Problem: ACTIVITY INTOLERANCE/IMPAIRED MOBILITY  Goal: Mobility/activity is maintained at optimum level for patient  4/23/2021 1115 by William Quiñones RN  Outcome: Ongoing  4/23/2021 0116 by Jessica Martines RN  Outcome: Ongoing     Problem: COMMUNICATION IMPAIRMENT  Goal: Ability to express needs and understand communication  4/23/2021 1115 by William Quiñones RN  Outcome: Ongoing  4/23/2021 0116 by Jessica Martines RN  Outcome: Ongoing     Problem: Pain:  Goal: Pain level will decrease  Description: Pain level will decrease  4/23/2021 1115 by William Quiñones RN  Outcome: Ongoing  4/23/2021 0116 by Jessica Martines RN  Outcome: Ongoing  Goal: Control of acute pain  Description: Control of acute pain  4/23/2021 1115 by William Quiñones RN  Outcome: Ongoing  4/23/2021 0116 by Jessica Martines RN  Outcome: Ongoing  Goal: Control of chronic pain  Description: Control of chronic pain  4/23/2021 1115 by William Quiñones RN  Outcome: Ongoing  4/23/2021 0116 by Jessica Martines RN  Outcome: Ongoing

## 2021-04-23 NOTE — PROGRESS NOTES
NAME:  Ji Veloz  YOB: 1965  MEDICAL RECORD NUMBER:  [id number]  TODAYS DATE:  4/22/2021    Discussed personal risk factors for Stroke /TIA with patient/family, and ways to reduce the risk for a recurrent stroke. Patient's personal risk factors which were identified are:     [] Alcohol Abuse: check with your physician before any alcohol consumption. [] Atrial fibrillation: may cause blood clots. [] Drug Abuse: Seek help, talk with your doctor  [] Clotting Disorder  [] Diabetes  [x] Family history of stroke or heart disease  [x] High Blood Pressure/Hypertension: work with your physician.  [] High cholesterol: monitor cholesterol levels with your physician.   [] Overweight/Obesity: work with your physician for your ideal body weight.  [] Physical Inactivity: get regular exercise as directed by your physician. [] Personal history of previous TIA or stroke  [x] Poor Diet; decrease salt (sodium) in your diet, follow diet directed by physician. [] Smoking: Cigarette/Cigar: stop smoking. Advised pt. that you can reduce your risk for stroke/TIA by modifying/controlling the risk factors that you have. Pt.advised to take the medications as prescribed, which will be detailed in the discharge instructions, and to not stop taking them without consulting their physician. In addition, pt. advised to maintain a healthy diet, exercise regularly and to not smoke. [facility]'s Stroke treatment and prevention, Managing your recovery  notebook  provided and/or reviewed  with patient/family. The notebook includes, but not limited to, sections addressing warning signs & symptoms of a stroke, which are: sudden numbness or weakness especially on one side of the body, sudden confusion, difficulty speaking or understanding, sudden changes in vision, sudden dizziness or loss of balance/ coordination, or sudden severe headache.   The need to call EMS (911) immediately if signs & symptoms occur is emphasized . The notebook also provides education on Stroke community resources and stroke advocacy. The need for follow-up after discharge was highlighted with patient/family with them being able to repeat understanding of the importance of this.       Electronically signed by Daniel Hogue RN on 4/22/2021 at 1600

## 2021-04-23 NOTE — PROGRESS NOTES
[facility] [place]  Personalized Stroke Treatment Plan  My Stroke Type:   [] Ischemic Stroke (Blockage of blood flow to the brain)     [] TIA  Transient Ischemic Attack (mini-stroke)    Personal risk factors you can control include:    [] Alcohol Abuse: check with your physician before any alcohol consumption. [] Atrial fibrillation: may cause blood clots. [] Drug Abuse: Seek help, talk with your doctor  [] Clotting Disorder  [] Diabetes  [] Family history of stroke or heart disease  [x] High Blood Pressure/Hypertension: work with your physician.  [] High cholesterol: monitor cholesterol levels with your physician.   [] Overweight/Obesity: work with your physician for your ideal body weight.  [] Physical Inactivity: get regular exercise as directed by your physician. [] Personal history of previous TIA or stroke  [x] Poor Diet; decrease salt (sodium) in your diet, follow diet directed by physician. [] Smoking: Cigarette/Cigar: stop smoking. Follow up with your physician is important after discharge. TAKE all medications as prescribed. Do not stop taking any medications   without talking to your physician. BE FAST is a simple way to remember the main symptoms of stroke. Recognizing these symptoms helps you know when to call for medical help. BE FAST stands for:                                                 B Balance problems                                                 E Eyes, vision lost        F  Face Drooping      A  Arm Weakness        S  Speech Difficulty      T  Time to Call 9-1-1  DO NOT DELAY THIS! Educated patient and/or family on personal risk factors for stroke/TIA. Included ways to reduce the risk for recurrent stroke. [facility] Stroke treatment and prevention, Managing your recovery  notebook  provided to patient. The notebook includes, but not limited to, sections addressing warning signs & symptoms of a stroke.  The need to call EMS (911) immediately if signs & symptoms occur is emphasized . Medication information will be reviewed at discharge. The notebook also provides education on Stroke community resources and stroke advocacy.     Electronically signed by Electronically signed by Jamal Alexander RN on 4/23/2021 at 11:17 AM

## 2021-04-23 NOTE — PROGRESS NOTES
4 Eyes Skin Assessment     NAME:  Miguel Mazariegos  YOB: 1965  MEDICAL RECORD NUMBER:  [id number]    The patient is being assess for  Admission    I agree that 2 RN's have performed a thorough Head to Toe Skin Assessment on the patient. ALL assessment sites listed below have been assessed. Areas assessed by both nurses:    Head, Face, Ears, Shoulders, Back, Chest, Arms, Elbows, Hands, Sacrum. Buttock, Coccyx, Ischium and Legs. Feet and Heels        Does the Patient have a Wound?  No noted wound(s)       Srinath Prevention initiated:  NA   Wound Care Orders initiated:  NA    Pressure Injury (Stage 3,4, Unstageable, DTI, NWPT, and Complex wounds) if present place consult order under [de-identified] NA    New and Established Ostomies if present place consult order under : NA      Nurse 1 eSignature: Electronically signed by Olivia Felder RN on 4/22/21 at 8:58 PM EDT    **SHARE this note so that the co-signing nurse is able to place an eSignature**    Nurse 2 eSignature: {Esignature:[id number]}

## 2021-04-23 NOTE — DISCHARGE SUMMARY
Hospital Medicine Discharge Summary    Patient ID: Erica Dumont      Patient's PCP: No primary care provider on file. Admit Date: 4/22/2021     Discharge Date:   4/23/2021    Admitting Physician: Mendoza Thorpe MD     Discharge Physician: Mendoza Thorpe MD     Discharge Diagnoses: Active Hospital Problems    Diagnosis    Cerebrovascular accident (CVA) associated with severe acute respiratory syndrome coronavirus 2 (SARS-CoV-2) infection (HCC) [U07.1, I63.9]    HTN (hypertension) [I10]       The patient was seen and examined on day of discharge and this discharge summary is in conjunction with any daily progress note from day of discharge. Hospital Course: The patient is a 55 y.o. male w Hx of gout, HTN, has not followed up with PCP or taken any BP meds in the past several years, usually active and works construction, who presents to [facility] with several complaints.      Pt reports he got up feeling well this morning. He spend some time in the bath tub and as he was transferring to the shower, became very lightheaded and experienced tingling sensation in his left arm. Pt denies vertigo. More of lightheaded feeling. He did not think he will pass out, but it was severe enough that he had to sit down.      As symptoms improved, he drove his children to school and then came to ED for evaluation. Acute Ischemic CVA R internal capsule. Sensation of Lightheadedness and tingling sensation left arm. Plan:               - CT head unremarkable. - CTA head without major ICA occlusion.               - MRI brain with R internal capsule area of acute ischemic infarct. - start ASA and lipitor.    - add plavix - plan for DAPT for 30 days - then continue with plavix alone with severe V4 stenosis of bilateral vertebral arteries.         HTN Urgency - resolving. Long standing uncontrolled HTN - lost to follow up as of 2016. Start amlodipine and HCTZ. Will need renal panel in 1-2 weeks post starting HCTZ  Thyroid studies - unremarkable.         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 As above. Plan for long term plavix and statin.          2.4cm Thyroid Nodule. Check TSH - normal.    Plan for OP thyroid US and NM iodine uptake scan. This will need follow up - with normal TSH he may need FNA pending US and CONDE uptake scan.            Gout - acute flair now much improved. Right ankle. Treat with indomethacin and allopurinol.                 Physical Exam Performed:     BP (!) 162/88   Pulse 92   Temp 98 °F (36.7 °C) (Oral)   Resp 16   Ht 5' 11\" (1.803 m)   Wt 245 lb 9.5 oz (111.4 kg)   SpO2 94%   BMI 34.25 kg/m²       General appearance:  No apparent distress, appears stated age and cooperative. HEENT:  Normal cephalic, atraumatic without obvious deformity. Pupils equal, round, and reactive to light. Extra ocular muscles intact. Conjunctivae/corneas clear. Neck: Supple, with full range of motion. No jugular venous distention. Trachea midline. Respiratory:  Normal respiratory effort. Clear to auscultation, bilaterally without Rales/Wheezes/Rhonchi. Cardiovascular:  Regular rate and rhythm with normal S1/S2 without murmurs, rubs or gallops. Abdomen: Soft, non-tender, non-distended with normal bowel sounds. Musculoskeletal:  No clubbing, cyanosis or edema bilaterally. Full range of motion without deformity. Skin: Skin color, texture, turgor normal.  No rashes or lesions. Neurologic:  Neurovascularly intact without any focal sensory/motor deficits. Cranial nerves: II-XII intact, grossly non-focal.  Psychiatric:  Alert and oriented, thought content appropriate, normal insight  Capillary Refill: Brisk,< 3 seconds   Peripheral Pulses: +2 palpable, equal bilaterally       Labs:  For convenience and continuity at follow-up the following most recent labs are provided:      CBC:    Lab Results   Component Value Date    WBC 11.3 04/23/2021    HGB 14.6 04/23/2021    HCT 42.0 04/23/2021     04/23/2021       Renal:    Lab Results   Component Value Date     04/22/2021    K 3.9 04/22/2021     04/22/2021    CO2 27 04/22/2021    BUN 11 04/22/2021    CREATININE 1.0 04/22/2021    CALCIUM 9.2 04/22/2021         Significant Diagnostic Studies    Radiology:   MRI brain without contrast   Preliminary Result   There is a 7 mm area of restricted diffusion in the posterior limb of the   right internal capsule consistent with an acute area of infarct. No other   areas of restricted diffusion are identified. Mild chronic small vessel ischemic changes. XR CHEST PORTABLE   Final Result   1. No acute abnormality. CTA HEAD NECK W CONTRAST   Final Result   1. Atherosclerosis contributes to moderate to severe stenosis involving the   V4 segments of the vertebral arteries bilaterally. 2. No focal stenosis otherwise seen of the [id number]. 3. No flow limiting stenosis of the cervical carotid/vertebral arteries. 4. Heterogeneous slightly enlarged appearance of the thyroid gland   bilaterally with a left thyroid nodule measuring up to 2.4 cm. Recommend   nonemergent thyroid ultrasound if not recently performed. RECOMMENDATIONS:   2.4 cm incidental left thyroid nodule with heterogeneous and enlarged   thyroid. Recommend thyroid US. Reference: J Am Brian Radiol. 2015 Feb;12(2): 143-50         CT HEAD WO CONTRAST   Final Result   No acute intracranial abnormality. Mild white matter changes consistent with chronic small vessel ischemic   disease. Multiple subcutaneous nodules are seen, some which calcifications. While   these are nonspecific and may be incidental, neurofibromatosis is not   excluded in the correct clinical setting. Findings were discussed with Galo Lynn at 9:49 am on 4/22/2021.                 Consults:     IP CONSULT TO STROKE TEAM  IP CONSULT TO NEUROLOGY Disposition:  home    Condition at Discharge: Stable    Discharge Instructions/Follow-up:  PCP 1 week. Code Status:  Full Code     Activity: activity as tolerated    Diet: regular diet      Discharge Medications:     Current Discharge Medication List           Details   aspirin 81 MG EC tablet Take 1 tablet by mouth daily  Qty: 30 tablet, Refills: 0      indomethacin (INDOCIN) 50 MG capsule Take 1 capsule by mouth 2 times daily (with meals) for 10 days  Qty: 20 capsule, Refills: 1      atorvastatin (LIPITOR) 80 MG tablet Take 1 tablet by mouth nightly  Qty: 30 tablet, Refills: 3      amLODIPine (NORVASC) 5 MG tablet Take 1 tablet by mouth daily  Qty: 30 tablet, Refills: 3      hydroCHLOROthiazide (HYDRODIURIL) 25 MG tablet Take 1 tablet by mouth daily  Qty: 30 tablet, Refills: 3      allopurinol (ZYLOPRIM) 100 MG tablet Take 1 tablet by mouth daily  Qty: 30 tablet, Refills: 3      clopidogrel (PLAVIX) 75 MG tablet Take 1 tablet by mouth daily  Qty: 30 tablet, Refills: 3             Time Spent on discharge is more than 30 minutes in the examination, evaluation, counseling and review of medications and discharge plan. Signed:    Prisca Santo MD   4/23/2021      Thank you No primary care provider on file. for the opportunity to be involved in this patient's care. If you have any questions or concerns please feel free to contact me at [phone].

## 2021-04-23 NOTE — CARE COORDINATION
CM follow up. [place] at [place]. Patient independent in care. Patient without PCP, [facility] and [facility] list given- patient will make his own f/u appointment. Patient given Valerie [facility] coupon card and referral to [facility] for further medication assistance done by myself. Patient is eligible for samantha meds at discharge. Messaged Dr Garland Schulte to send meds to retail pharmacy before 3pm today.   Patient to f/u with [facility] for financial/Medicaid assist.  Electronically signed by Pippa Erwin RN Case Management [phone] on 4/23/2021 at 1:56 PM

## 2021-04-23 NOTE — PROGRESS NOTES
NAME:  Lena Ling  YOB: 1965  MEDICAL RECORD NUMBER:  [id number]  TODAYS DATE:  4/23/2021    Discussed personal risk factors for Stroke /TIA with patient/family, and ways to reduce the risk for a recurrent stroke. Patient's personal risk factors which were identified are:     [] Alcohol Abuse: check with your physician before any alcohol consumption. [] Atrial fibrillation: may cause blood clots. [] Drug Abuse: Seek help, talk with your doctor  [] Clotting Disorder  [] Diabetes  [x] Family history of stroke or heart disease  [x] High Blood Pressure/Hypertension: work with your physician.  [] High cholesterol: monitor cholesterol levels with your physician.   [] Overweight/Obesity: work with your physician for your ideal body weight.  [] Physical Inactivity: get regular exercise as directed by your physician. [] Personal history of previous TIA or stroke  [x] Poor Diet; decrease salt (sodium) in your diet, follow diet directed by physician. [] Smoking: Cigarette/Cigar: stop smoking. Advised pt. that you can reduce your risk for stroke/TIA by modifying/controlling the risk factors that you have. Pt.advised to take the medications as prescribed, which will be detailed in the discharge instructions, and to not stop taking them without consulting their physician. In addition, pt. advised to maintain a healthy diet, exercise regularly and to not smoke. [facility]'s Stroke treatment and prevention, Managing your recovery  notebook  provided and/or reviewed  with patient/family. The notebook includes, but not limited to, sections addressing warning signs & symptoms of a stroke, which are: sudden numbness or weakness especially on one side of the body, sudden confusion, difficulty speaking or understanding, sudden changes in vision, sudden dizziness or loss of balance/ coordination, or sudden severe headache.   The need to call EMS (911) immediately if signs & symptoms occur is emphasized . The notebook also provides education on Stroke community resources and stroke advocacy. The need for follow-up after discharge was highlighted with patient/family with them being able to repeat understanding of the importance of this.       Electronically signed by Jaleel Nunez RN on 4/23/2021 at 6:56 AM

## 2021-04-23 NOTE — PLAN OF CARE
Problem: Falls - Risk of:  Goal: Will remain free from falls  Description: Will remain free from falls  4/23/2021 0116 by Jessica Martines RN  Outcome: Ongoing     Problem: Falls - Risk of:  Goal: Absence of physical injury  Description: Absence of physical injury  4/23/2021 0116 by Jessica Martines RN  Outcome: Ongoing     Problem: HEMODYNAMIC STATUS  Goal: Patient has stable vital signs and fluid balance  4/23/2021 0116 by Jessica Martines RN  Outcome: Ongoing     Problem: ACTIVITY INTOLERANCE/IMPAIRED MOBILITY  Goal: Mobility/activity is maintained at optimum level for patient  4/23/2021 0116 by Jessica Martines RN  Outcome: Ongoing     Problem: COMMUNICATION IMPAIRMENT  Goal: Ability to express needs and understand communication  4/23/2021 0116 by Jessica Martines RN  Outcome: Ongoing     Problem: Pain:  Goal: Pain level will decrease  Description: Pain level will decrease  Outcome: Ongoing     Problem: Pain:  Goal: Control of acute pain  Description: Control of acute pain  Outcome: Ongoing     Problem: Pain:  Goal: Control of chronic pain  Description: Control of chronic pain  Outcome: Ongoing

## 2021-11-05 ENCOUNTER — OFFICE VISIT (OUTPATIENT)
Dept: INTERNAL MEDICINE CLINIC | Age: 56
End: 2021-11-05
Payer: MEDICARE

## 2021-11-05 VITALS
HEART RATE: 74 BPM | HEIGHT: 71 IN | BODY MASS INDEX: 33.8 KG/M2 | WEIGHT: 241.4 LBS | DIASTOLIC BLOOD PRESSURE: 82 MMHG | OXYGEN SATURATION: 97 % | SYSTOLIC BLOOD PRESSURE: 138 MMHG

## 2021-11-05 DIAGNOSIS — Z12.11 COLON CANCER SCREENING: ICD-10-CM

## 2021-11-05 DIAGNOSIS — Z12.5 PROSTATE CANCER SCREENING: ICD-10-CM

## 2021-11-05 DIAGNOSIS — Z11.59 NEED FOR HEPATITIS C SCREENING TEST: ICD-10-CM

## 2021-11-05 DIAGNOSIS — E04.1 THYROID NODULE: Primary | ICD-10-CM

## 2021-11-05 DIAGNOSIS — Z00.00 PREVENTATIVE HEALTH CARE: ICD-10-CM

## 2021-11-05 DIAGNOSIS — Z11.4 SCREENING FOR HUMAN IMMUNODEFICIENCY VIRUS WITHOUT PRESENCE OF RISK FACTORS: ICD-10-CM

## 2021-11-05 DIAGNOSIS — I10 PRIMARY HYPERTENSION: ICD-10-CM

## 2021-11-05 DIAGNOSIS — Z76.89 ENCOUNTER TO ESTABLISH CARE: ICD-10-CM

## 2021-11-05 DIAGNOSIS — M10.9 GOUT, UNSPECIFIED CAUSE, UNSPECIFIED CHRONICITY, UNSPECIFIED SITE: ICD-10-CM

## 2021-11-05 DIAGNOSIS — E78.2 MIXED HYPERLIPIDEMIA: ICD-10-CM

## 2021-11-05 DIAGNOSIS — I65.03 VERTEBRAL ARTERY STENOSIS, BILATERAL: ICD-10-CM

## 2021-11-05 DIAGNOSIS — I63.89 CEREBROVASCULAR ACCIDENT (CVA) DUE TO OTHER MECHANISM (HCC): ICD-10-CM

## 2021-11-05 DIAGNOSIS — Z23 NEED FOR COVID-19 VACCINE: ICD-10-CM

## 2021-11-05 LAB
ALBUMIN SERPL-MCNC: 4.7 G/DL (ref 3.4–5)
ANION GAP SERPL CALCULATED.3IONS-SCNC: 16 MMOL/L (ref 3–16)
BASOPHILS ABSOLUTE: 0 K/UL (ref 0–0.2)
BASOPHILS RELATIVE PERCENT: 0.6 %
BUN BLDV-MCNC: 13 MG/DL (ref 7–20)
CALCIUM SERPL-MCNC: 9.7 MG/DL (ref 8.3–10.6)
CHLORIDE BLD-SCNC: 102 MMOL/L (ref 99–110)
CHOLESTEROL, FASTING: 117 MG/DL (ref 0–199)
CO2: 26 MMOL/L (ref 21–32)
CREAT SERPL-MCNC: 1 MG/DL (ref 0.9–1.3)
EOSINOPHILS ABSOLUTE: 0.1 K/UL (ref 0–0.6)
EOSINOPHILS RELATIVE PERCENT: 0.9 %
GFR AFRICAN AMERICAN: >60
GFR NON-AFRICAN AMERICAN: >60
GLUCOSE BLD-MCNC: 79 MG/DL (ref 70–99)
HCT VFR BLD CALC: 45.8 % (ref 40.5–52.5)
HDLC SERPL-MCNC: 35 MG/DL (ref 40–60)
HEMOGLOBIN: 15.3 G/DL (ref 13.5–17.5)
HEPATITIS C ANTIBODY INTERPRETATION: NORMAL
LDL CHOLESTEROL CALCULATED: 60 MG/DL
LYMPHOCYTES ABSOLUTE: 2.4 K/UL (ref 1–5.1)
LYMPHOCYTES RELATIVE PERCENT: 33.8 %
MCH RBC QN AUTO: 29.1 PG (ref 26–34)
MCHC RBC AUTO-ENTMCNC: 33.4 G/DL (ref 31–36)
MCV RBC AUTO: 87.1 FL (ref 80–100)
MONOCYTES ABSOLUTE: 0.4 K/UL (ref 0–1.3)
MONOCYTES RELATIVE PERCENT: 5 %
NEUTROPHILS ABSOLUTE: 4.3 K/UL (ref 1.7–7.7)
NEUTROPHILS RELATIVE PERCENT: 59.7 %
PDW BLD-RTO: 13.9 % (ref 12.4–15.4)
PHOSPHORUS: 3.2 MG/DL (ref 2.5–4.9)
PLATELET # BLD: 260 K/UL (ref 135–450)
PMV BLD AUTO: 7.4 FL (ref 5–10.5)
POTASSIUM SERPL-SCNC: 3.6 MMOL/L (ref 3.5–5.1)
PROSTATE SPECIFIC ANTIGEN: 0.81 NG/ML (ref 0–4)
RBC # BLD: 5.26 M/UL (ref 4.2–5.9)
SODIUM BLD-SCNC: 144 MMOL/L (ref 136–145)
TRIGLYCERIDE, FASTING: 109 MG/DL (ref 0–150)
TSH REFLEX: 2 UIU/ML (ref 0.27–4.2)
URIC ACID, SERUM: 8.5 MG/DL (ref 3.5–7.2)
VITAMIN D 25-HYDROXY: 26.4 NG/ML
VLDLC SERPL CALC-MCNC: 22 MG/DL
WBC # BLD: 7.2 K/UL (ref 4–11)

## 2021-11-05 PROCEDURE — 36415 COLL VENOUS BLD VENIPUNCTURE: CPT | Performed by: NURSE PRACTITIONER

## 2021-11-05 PROCEDURE — G8484 FLU IMMUNIZE NO ADMIN: HCPCS | Performed by: NURSE PRACTITIONER

## 2021-11-05 PROCEDURE — G8417 CALC BMI ABV UP PARAM F/U: HCPCS | Performed by: NURSE PRACTITIONER

## 2021-11-05 PROCEDURE — 99204 OFFICE O/P NEW MOD 45 MIN: CPT | Performed by: NURSE PRACTITIONER

## 2021-11-05 PROCEDURE — G8427 DOCREV CUR MEDS BY ELIG CLIN: HCPCS | Performed by: NURSE PRACTITIONER

## 2021-11-05 PROCEDURE — 3017F COLORECTAL CA SCREEN DOC REV: CPT | Performed by: NURSE PRACTITIONER

## 2021-11-05 PROCEDURE — 1036F TOBACCO NON-USER: CPT | Performed by: NURSE PRACTITIONER

## 2021-11-05 RX ORDER — CLOPIDOGREL BISULFATE 75 MG/1
75 TABLET ORAL DAILY
Qty: 30 TABLET | Refills: 0 | Status: SHIPPED | OUTPATIENT
Start: 2021-11-05 | End: 2021-12-02

## 2021-11-05 RX ORDER — AMLODIPINE BESYLATE 5 MG/1
5 TABLET ORAL DAILY
Qty: 30 TABLET | Refills: 0 | Status: SHIPPED | OUTPATIENT
Start: 2021-11-05 | End: 2021-11-05

## 2021-11-05 RX ORDER — HYDROCHLOROTHIAZIDE 25 MG/1
25 TABLET ORAL DAILY
Qty: 30 TABLET | Refills: 0 | Status: SHIPPED | OUTPATIENT
Start: 2021-11-05 | End: 2021-12-02

## 2021-11-05 RX ORDER — ATORVASTATIN CALCIUM 80 MG/1
80 TABLET, FILM COATED ORAL NIGHTLY
Qty: 30 TABLET | Refills: 0 | Status: SHIPPED | OUTPATIENT
Start: 2021-11-05 | End: 2021-12-02

## 2021-11-05 RX ORDER — ALLOPURINOL 100 MG/1
100 TABLET ORAL DAILY
Qty: 30 TABLET | Refills: 0 | Status: SHIPPED | OUTPATIENT
Start: 2021-11-05 | End: 2021-11-08

## 2021-11-05 RX ORDER — AMLODIPINE BESYLATE 10 MG/1
10 TABLET ORAL DAILY
Qty: 30 TABLET | Refills: 0 | Status: SHIPPED | OUTPATIENT
Start: 2021-11-05 | End: 2021-12-02

## 2021-11-05 SDOH — ECONOMIC STABILITY: FOOD INSECURITY: WITHIN THE PAST 12 MONTHS, YOU WORRIED THAT YOUR FOOD WOULD RUN OUT BEFORE YOU GOT MONEY TO BUY MORE.: NEVER TRUE

## 2021-11-05 SDOH — ECONOMIC STABILITY: FOOD INSECURITY: WITHIN THE PAST 12 MONTHS, THE FOOD YOU BOUGHT JUST DIDN'T LAST AND YOU DIDN'T HAVE MONEY TO GET MORE.: NEVER TRUE

## 2021-11-05 ASSESSMENT — SOCIAL DETERMINANTS OF HEALTH (SDOH): HOW HARD IS IT FOR YOU TO PAY FOR THE VERY BASICS LIKE FOOD, HOUSING, MEDICAL CARE, AND HEATING?: NOT HARD AT ALL

## 2021-11-05 ASSESSMENT — ENCOUNTER SYMPTOMS
SHORTNESS OF BREATH: 0
CONSTIPATION: 0
ABDOMINAL PAIN: 0
NAUSEA: 0
DIARRHEA: 0
BACK PAIN: 0
COUGH: 0
VOMITING: 0
BLOOD IN STOOL: 0

## 2021-11-05 ASSESSMENT — PATIENT HEALTH QUESTIONNAIRE - PHQ9
SUM OF ALL RESPONSES TO PHQ QUESTIONS 1-9: 0
SUM OF ALL RESPONSES TO PHQ9 QUESTIONS 1 & 2: 0
SUM OF ALL RESPONSES TO PHQ QUESTIONS 1-9: 0
SUM OF ALL RESPONSES TO PHQ QUESTIONS 1-9: 0
2. FEELING DOWN, DEPRESSED OR HOPELESS: 0
1. LITTLE INTEREST OR PLEASURE IN DOING THINGS: 0

## 2021-11-05 NOTE — PROGRESS NOTES
Date: 11/5/2021                                               Subjective/Objective:     Chief Complaint   Patient presents with    Established New Doctor       HPI     Cinda Riedel is a 54 yo male. Visit today to establish care. Works in construction. Is concerned about dosing of medications, would like to minimize his medication regimen as much as possible. Is requesting COVID antibody testing. Patient was admitted to [facility] 4/2021. Per chart, he had not received medical care in several years prior. 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 Imaging from admission as below -   MRI head: There is a 7 mm area of restricted diffusion in the posterior limb of the   right internal capsule consistent with an acute area of infarct.  No other   areas of restricted diffusion are identified.       Mild chronic small vessel ischemic changes. CT head:  No acute intracranial abnormality.       Mild white matter changes consistent with chronic small vessel ischemic   disease.       Multiple subcutaneous nodules are seen, some which calcifications.  While   these are nonspecific and may be incidental, neurofibromatosis is not   excluded in the correct clinical setting.         CTA Head and Neck:  1. Atherosclerosis contributes to moderate to severe stenosis involving the   V4 segments of the vertebral arteries bilaterally. 2. No focal stenosis otherwise seen of the [id number]. 3. No flow limiting stenosis of the cervical carotid/vertebral arteries. 4. Heterogeneous slightly enlarged appearance of the thyroid gland   bilaterally with a left thyroid nodule measuring up to 2.4 cm.  Recommend   nonemergent thyroid ultrasound if not recently performed.       RECOMMENDATIONS:   2.4 cm incidental left thyroid nodule with heterogeneous and enlarged   thyroid. Recommend thyroid US. ECHO:   Summary   Normal left ventricle size and systolic function with an estimated ejection   fraction of 50-55%.  No regional wall motion abnormalities are seen. There is   mildly increased left ventricular wall thickness. Diastolic filling   parameters suggest grade I diastolic dysfunction. Normal right ventricular size and function. Trivial mitral and tricuspid regurgitation. The ascending aorta is mildly dilated at 3.9 cm. A bubble study was performed and fails to show evidence of shunting. In regards to CVA, he was to be on DAPT for 30 days followed by Plavix alone due to severe V4 stenosis of bilateral vertebral arteries. He had incidental finding of left thyroid nodule with normal TSH, he had Thyroid US and CONDE uptake scan ordered on discharge that was not completed. He had gout flare which was treated with indomethacin and allopurinol. Reports he did follow up with neurology and was discharged from their care. Denies any residual effects from his stroke, denies any speech difficulties, weakness, numbness. Hyperlipidemia is currently managed on atorvastatin. Denies myalgias, GI upset. Lab Results   Component Value Date    CHOL 193 04/23/2021    TRIG 93 04/23/2021    HDL 36 (L) 04/23/2021    LDLCALC 138 (H) 04/23/2021     Lab Results   Component Value Date    ALT 16 04/22/2021    AST 13 (L) 04/22/2021        History of hypertension, currently managed on amlodipine and HCTZ. Does monitor his blood pressure at home. Reports has been running 130s/80s in the morning. Takes his blood pressure medications at night. Denies medication side effects. Denies chest pain, SOB. Age/Gender Health Maintenance    Colon Cancer Screening - colonoscopy at  over 10 years, normal ago per patient. Father had colon CA.    Never smoker    Shignrix - contemplating     PSA screening - needs               Patient Active Problem List    Diagnosis Date Noted    Cerebrovascular accident (CVA) associated with severe acute respiratory syndrome coronavirus 2 (SARS-CoV-2) infection ([facility] [address] 04/23/2021    HTN (hypertension) 04/22/2021 Past Medical History:   Diagnosis Date    Cerebrovascular disease     CVA (cerebral vascular accident) ([facility] [address]     Gout     Hyperlipidemia     Hypertension        History reviewed. No pertinent surgical history.     Admission on 04/22/2021, Discharged on 04/23/2021   Component Date Value Ref Range Status    POC Glucose 04/22/2021 100* 70 - 99 mg/dl Final    Performed on 04/22/2021 ACCU-CHEK   Final    Ventricular Rate 04/22/2021 94  BPM Final    Atrial Rate 04/22/2021 94  BPM Final    P-R Interval 04/22/2021 148  ms Final    QRS Duration 04/22/2021 98  ms Final    Q-T Interval 04/22/2021 390  ms Final    QTc Calculation ([place]azett) 04/22/2021 487  ms Final    P Axis 04/22/2021 41  degrees Final    R Axis 04/22/2021 -40  degrees Final    T Axis 04/22/2021 43  degrees Final    Diagnosis 04/22/2021 Normal sinus rhythmLeft axis deviationDelayed transitionProlonged QTAbnormal ECGNo previous ECGs availableConfirmed by LENIN Hodge ([id number]) on 4/23/2021 3:59:31 PM   Final    WBC 04/22/2021 9.5  4.0 - 11.0 K/uL Final    RBC 04/22/2021 5.10  4.20 - 5.90 M/uL Final    Hemoglobin 04/22/2021 14.7  13.5 - 17.5 g/dL Final    Hematocrit 04/22/2021 43.6  40.5 - 52.5 % Final    MCV 04/22/2021 85.4  80.0 - 100.0 fL Final    MCH 04/22/2021 28.9  26.0 - 34.0 pg Final    MCHC 04/22/2021 33.8  31.0 - 36.0 g/dL Final    RDW 04/22/2021 14.2  12.4 - 15.4 % Final    Platelets 09/19/9750 309  135 - 450 K/uL Final    MPV 04/22/2021 7.5  5.0 - 10.5 fL Final    Neutrophils % 04/22/2021 67.4  % Final    Lymphocytes % 04/22/2021 24.0  % Final    Monocytes % 04/22/2021 7.3  % Final    Eosinophils % 04/22/2021 0.8  % Final    Basophils % 04/22/2021 0.5  % Final    Neutrophils Absolute 04/22/2021 6.4  1.7 - 7.7 K/uL Final    Lymphocytes Absolute 04/22/2021 2.3  1.0 - 5.1 K/uL Final    Monocytes Absolute 04/22/2021 0.7  0.0 - 1.3 K/uL Final    Eosinophils Absolute 04/22/2021 0.1  0.0 - 0.6 K/uL Final    Basophils Absolute 04/22/2021 0.0  0.0 - 0.2 K/uL Final    Sodium 04/22/2021 141  136 - 145 mmol/L Final    Potassium reflex Magnesium 04/22/2021 3.9  3.5 - 5.1 mmol/L Final    Chloride 04/22/2021 104  99 - 110 mmol/L Final    CO2 04/22/2021 27  21 - 32 mmol/L Final    Anion Gap 04/22/2021 10  3 - 16 Final    Glucose 04/22/2021 107* 70 - 99 mg/dL Final    BUN 04/22/2021 11  7 - 20 mg/dL Final    CREATININE 04/22/2021 1.0  0.9 - 1.3 mg/dL Final    GFR Non- 04/22/2021 >60  >60 Final    Comment: >60 mL/min/1.73m2 EGFR, calc. for ages 25 and older using the  MDRD formula (not corrected for weight), is valid for stable  renal function.  GFR  04/22/2021 >60  >60 Final    Comment: Chronic Kidney Disease: less than 60 ml/min/1.73 sq.m. Kidney Failure: less than 15 ml/min/1.73 sq.m. Results valid for patients 18 years and older.  Calcium 04/22/2021 9.2  8.3 - 10.6 mg/dL Final    Total Protein 04/22/2021 7.9  6.4 - 8.2 g/dL Final    Albumin 04/22/2021 4.3  3.4 - 5.0 g/dL Final    Albumin/Globulin Ratio 04/22/2021 1.2  1.1 - 2.2 Final    Total Bilirubin 04/22/2021 0.6  0.0 - 1.0 mg/dL Final    Alkaline Phosphatase 04/22/2021 81  40 - 129 U/L Final    ALT 04/22/2021 16  10 - 40 U/L Final    AST 04/22/2021 13* 15 - 37 U/L Final    Globulin 04/22/2021 3.6  g/dL Final    Troponin 04/22/2021 <0.01  <0.01 ng/mL Final    Methodology by Troponin T    Protime 04/22/2021 11.5  10.0 - 13.2 sec Final    Comment: Effective 11-19-19 09:00am EST  Please note reference ranges have  changed for PT and INR Testing.  INR 04/22/2021 0.99  0.86 - 1.14 Final    Comment: Effective 11/19/19 at 09:00am EST    Normal: 0.86 - 1.14  Therapeutic: 2.0 - 3.0  Pros.  Valve: 2.5 - 3.5  AMI: 2.0 - 3.0      Amphetamine Screen, Urine 04/22/2021 Neg  Negative <1000ng/mL Final    Barbiturate Screen, Ur 04/22/2021 Neg  Negative <200 ng/mL Final    Benzodiazepine Screen, Urine 04/22/2021 Neg Negative <200 ng/mL Final    Cannabinoid Scrn, Ur 04/22/2021 Neg  Negative <50 ng/mL Final    Cocaine Metabolite Screen, Urine 04/22/2021 Neg  Negative <300 ng/mL Final    Opiate Scrn, Ur 04/22/2021 Neg  Negative <300 ng/mL Final    Comment: \"Therapeutic levels of pain medication, especially oxycontin and synthetic  opioids, may not be detected by this Methodology. Pain management screen  panel  Drug panel-PM-Hi Res Ur, Interp (PAIN) should be considered for drug  monitoring \".  PCP Screen, Urine 04/22/2021 Neg  Negative <25 ng/mL Final    Methadone Screen, Urine 04/22/2021 Neg  Negative <300 ng/mL Final    Propoxyphene Scrn, Ur 04/22/2021 Neg  Negative <300 ng/mL Final    Oxycodone Urine 04/22/2021 Neg  Negative <100 ng/ml Final    pH, UA 04/22/2021 6.0   Final    Comment: Urine pH less than 5.0 or greater than 8.0 may indicate sample adulteration. Another sample should be collected if clinically  indicated.  Drug Screen Comment: 04/22/2021 see below   Final    Comment: This method is a screening test to detect only these drug  classes as part of a medical workup. Confirmatory testing  by another method should be ordered if clinically indicated.       Color, UA 04/22/2021 YELLOW  Straw/Yellow Final    Clarity, UA 04/22/2021 Clear  Clear Final    Glucose, Ur 04/22/2021 Negative  Negative mg/dL Final    Bilirubin Urine 04/22/2021 Negative  Negative Final    Ketones, Urine 04/22/2021 Negative  Negative mg/dL Final    Specific Gravity, UA 04/22/2021 1.023  1.005 - 1.030 Final    Blood, Urine 04/22/2021 Negative  Negative Final    pH, UA 04/22/2021 7.0  5.0 - 8.0 Final    Protein, UA 04/22/2021 Negative  Negative mg/dL Final    Urobilinogen, Urine 04/22/2021 0.2  <2.0 E.U./dL Final    Nitrite, Urine 04/22/2021 Negative  Negative Final    Leukocyte Esterase, Urine 04/22/2021 Negative  Negative Final    Microscopic Examination 04/22/2021 Not Indicated   Final    Urine Type 04/22/2021 NotGiven   Final    Hyaline Casts, UA 04/22/2021 1  0 - 8 /LPF Final    WBC, UA 04/22/2021 0  0 - 5 /HPF Final    RBC, UA 04/22/2021 1  0 - 4 /HPF Final    Epithelial Cells, UA 04/22/2021 0  0 - 5 /HPF Final    Comment: Urinalysis microscopic performed using the  automated methodology (AUWI analyzer).       TSH 04/22/2021 4.14  0.27 - 4.20 uIU/mL Final    WBC 04/23/2021 11.3* 4.0 - 11.0 K/uL Final    RBC 04/23/2021 4.98  4.20 - 5.90 M/uL Final    Hemoglobin 04/23/2021 14.6  13.5 - 17.5 g/dL Final    Hematocrit 04/23/2021 42.0  40.5 - 52.5 % Final    MCV 04/23/2021 84.3  80.0 - 100.0 fL Final    MCH 04/23/2021 29.3  26.0 - 34.0 pg Final    MCHC 04/23/2021 34.8  31.0 - 36.0 g/dL Final    RDW 04/23/2021 14.0  12.4 - 15.4 % Final    Platelets 34/80/4424 318  135 - 450 K/uL Final    MPV 04/23/2021 7.1  5.0 - 10.5 fL Final    Hemoglobin A1C 04/23/2021 5.3  See comment % Final    Comment: Comment:  Diagnosis of Diabetes: > or = 6.5%  Increased risk of diabetes (Prediabetes): 5.7-6.4%  Glycemic Control: Nonpregnant Adults: <7.0%                    Pregnant: <6.0%        eAG 04/23/2021 105.4  mg/dL Final    Cholesterol, Total 04/23/2021 193  0 - 199 mg/dL Final    Triglycerides 04/23/2021 93  0 - 150 mg/dL Final    HDL 04/23/2021 36* 40 - 60 mg/dL Final    LDL Calculated 04/23/2021 138* <100 mg/dL Final    VLDL Cholesterol Calculated 04/23/2021 19  Not Established mg/dL Final    Left Ventricular Ejection Fraction 04/23/2021 53   Final-Edited    LVEF MODALITY 04/23/2021 ECHO   Final-Edited       Family History   Problem Relation Age of Onset    Cancer Father         colon cancer       Current Outpatient Medications   Medication Sig Dispense Refill    allopurinol (ZYLOPRIM) 100 MG tablet Take 1 tablet by mouth daily 30 tablet 0    atorvastatin (LIPITOR) 80 MG tablet Take 1 tablet by mouth nightly 30 tablet 0    clopidogrel (PLAVIX) 75 MG tablet Take 1 tablet by mouth daily 30 tablet 0    hydroCHLOROthiazide (HYDRODIURIL) 25 MG tablet Take 1 tablet by mouth daily 30 tablet 0    amLODIPine (NORVASC) 10 MG tablet Take 1 tablet by mouth daily 30 tablet 0     No current facility-administered medications for this visit. No Known Allergies    Review of Systems   Constitutional: Negative for chills and fever. Respiratory: Negative for cough and shortness of breath. Cardiovascular: Negative for chest pain and leg swelling. Gastrointestinal: Negative for abdominal pain, blood in stool, constipation, diarrhea, nausea and vomiting. Genitourinary: Negative for difficulty urinating. Musculoskeletal: Negative for arthralgias, back pain and myalgias. Skin: Negative for rash. Neurological: Negative for dizziness, syncope and headaches. Psychiatric/Behavioral: Negative for sleep disturbance. Vitals:  /82   Pulse 74   Ht 5' 11\" (1.803 m)   Wt 241 lb 6.4 oz (109.5 kg)   SpO2 97%   BMI 33.67 kg/m²     Physical Exam  Vitals reviewed. Constitutional:       Appearance: Normal appearance. He is obese. HENT:      Head: Normocephalic and atraumatic. Right Ear: Tympanic membrane, ear canal and external ear normal.      Left Ear: Tympanic membrane, ear canal and external ear normal.   Cardiovascular:      Rate and Rhythm: Normal rate and regular rhythm. Pulses: Normal pulses. Heart sounds: Normal heart sounds. No murmur heard. Pulmonary:      Effort: Pulmonary effort is normal. No respiratory distress. Breath sounds: Normal breath sounds. No wheezing. Abdominal:      General: Bowel sounds are normal. There is no distension. Palpations: Abdomen is soft. Tenderness: There is no abdominal tenderness. Lymphadenopathy:      Cervical: No cervical adenopathy. Skin:     General: Skin is warm and dry. Neurological:      General: No focal deficit present. Mental Status: He is alert and oriented to person, place, and time.    Psychiatric: Mood and Affect: Mood normal.         Behavior: Behavior normal.         Thought Content: Thought content normal.         Judgment: Judgment normal.         Assessment/Plan     1. Encounter to establish care    2. Cerebrovascular accident (CVA) due to other mechanism [facility]):   - Discussed need for aggressive risk factor modification   - Request record from neurology   - atorvastatin (LIPITOR) 80 MG tablet; Take 1 tablet by mouth nightly  Dispense: 30 tablet; Refill: 0  - clopidogrel (PLAVIX) 75 MG tablet; Take 1 tablet by mouth daily  Dispense: 30 tablet; Refill: 0  - Lipid, Fasting; Future    3. Primary hypertension: slightly above goal in clinic today  - Increase amlodipine and follow up in 3 months  - Diet/lifestyle measures also advised  - hydroCHLOROthiazide (HYDRODIURIL) 25 MG tablet; Take 1 tablet by mouth daily  Dispense: 30 tablet; Refill: 0  - amLODIPine (NORVASC) 10 MG tablet; Take 1 tablet by mouth daily  Dispense: 30 tablet; Refill: 0  - RENAL FUNCTION PANEL; Future    4. Mixed hyperlipidemia: continue atorvastatin 80 mg daily  - Check fasting lipid panel  - D/w patient importance of high intensity statin and role in stroke risk factor modification    - Lipid, Fasting; Future    5. Thyroid nodule: with prior normal TSH  - Check TSH, obtain Thyroid US and NM scan  - US THYROID; Future  - NM THYROID SCAN; Future  - Lizette Delarosa MD, Otolaryngology, [facility]  - TSH with Reflex; Future    6. Vertebral artery stenosis, bilateral: continue plavix/atorvastatin per neurology recommendations  - atorvastatin (LIPITOR) 80 MG tablet; Take 1 tablet by mouth nightly  Dispense: 30 tablet; Refill: 0  - clopidogrel (PLAVIX) 75 MG tablet; Take 1 tablet by mouth daily  Dispense: 30 tablet; Refill: 0    7. Gout, unspecified cause, unspecified chronicity, unspecified site: without current flare.  Continue allopurinol.  - Check uric acid, adjust therapy as necessary   - allopurinol (ZYLOPRIM) 100 MG tablet; Take 1 tablet by mouth daily  Dispense: 30 tablet; Refill: 0  - URIC ACID; Future    8. Prostate cancer screening  - Psa screening; Future    9. Colon cancer screening: FH of colon cancer in father. Last colonoscopy over 10 years ago. Needs colonoscopy!   - AFL - Jony Verduzco MD, Gastroenterology, [facility]    10. Screening for human immunodeficiency virus without presence of risk factors  - HIV-1 AND HIV-2 ANTIBODIES; Future    11. Need for hepatitis C screening test  - HEPATITIS C ANTIBODY; Future    12. Need for COVID-19 vaccine: patient declines vaccination. Requests antibody testing.   - Covid-19, Antibody; Future    13. Preventative health care  - CBC WITH AUTO DIFFERENTIAL;  Future  - VITAMIN D 25 HYDROXY; Future      Orders Placed This Encounter   Procedures    US THYROID     Standing Status:   Future     Standing Expiration Date:   11/5/2022     Order Specific Question:   Reason for exam:     Answer:   F/u thyroid nodule    NM THYROID SCAN     Standing Status:   Future     Standing Expiration Date:   11/5/2022     Order Specific Question:   Reason for exam:     Answer:   thyroid nodule, normal TSH    CBC WITH AUTO DIFFERENTIAL     Standing Status:   Future     Number of Occurrences:   1     Standing Expiration Date:   11/5/2022    HEPATITIS C ANTIBODY     Standing Status:   Future     Number of Occurrences:   1     Standing Expiration Date:   11/5/2022    HIV-1 AND HIV-2 ANTIBODIES     Standing Status:   Future     Number of Occurrences:   1     Standing Expiration Date:   11/5/2022    Lipid, Fasting     Standing Status:   Future     Number of Occurrences:   1     Standing Expiration Date:   11/5/2022    Psa screening     Standing Status:   Future     Number of Occurrences:   1     Standing Expiration Date:   11/5/2022    RENAL FUNCTION PANEL     Standing Status:   Future     Number of Occurrences:   1     Standing Expiration Date:   11/5/2022    TSH with Reflex     Standing Status:   Future     Number of Occurrences:   1     Standing Expiration Date:   11/5/2022    URIC ACID     Standing Status:   Future     Number of Occurrences:   1     Standing Expiration Date:   11/5/2022    VITAMIN D 25 HYDROXY     Standing Status:   Future     Number of Occurrences:   1     Standing Expiration Date:   11/5/2022    Covid-19, Antibody     Standing Status:   Future     Standing Expiration Date:   11/5/2022     Scheduling Instructions:      CDC does not recommend using antibody testing to diagnose acute infection. It is recommended to use a direct viral detection test to diagnose acute infection. (COVID-19 [facility] [id number])            Antibody tests are not 100% accurate, and some false-positive results or false-negative results may occur. A positive result may not ensure immunity from reinfection. Per CDC, positive or negative results do not confirm whether a patient is able to spread the virus that causes COVID-19     Order Specific Question:   Symptomatic for COVID-19 as defined by CDC? Answer:   Yes     Order Specific Question:   Date of Symptom Onset     Answer:   11/5/2021     Order Specific Question:   Hospitalized for COVID-19? Answer:   No     Order Specific Question:   Admitted to ICU for COVID-19? Answer:   No     Order Specific Question:   Employed in healthcare setting? Answer:   No     Order Specific Question:   Resident in a congregate (group) care setting? Answer:   No     Order Specific Question:   Pregnant: Answer:   No     Order Specific Question:   Previously tested for COVID-19?      Answer:   Ariadna Kelly MD, Otolaryngology, [facility]     Referral Priority:   Routine     Referral Type:   Eval and Treat     Referral Reason:   Specialty Services Required     Referred to Provider:   Zack Chao MD     Requested Specialty:   Otolaryngology     Number of Visits Requested:   1    MORALES - Jony Verduzco MD, Gastroenterology, [facility]     Referral Priority:   Routine     Referral Type:   Eval and Treat     Referral Reason:   Specialty Services Required     Referred to Provider:   Chanel Carrasco MD     Requested Specialty:   Gastroenterology     Number of Visits Requested:   1       Return in about 3 months (around 2/5/2022) for hypertension. OR sooner with questions, concerns, worsening symptoms    RELL INGRAM, NP    11/5/2021  12:11 PM    Discussed use, benefit, and side effects of prescribed medications. Barriers to medication compliance addressed. Discussed all ordered testing and labs. All patient questions answered. Patient agreeable with plan above. Please note that this chart was generated using dragon dictation software. Although every effort was made to ensure the accuracy of this automated transcription, some errors in transcription may have occurred.

## 2021-11-06 LAB
HIV AG/AB: NORMAL
HIV ANTIGEN: NORMAL
HIV-1 ANTIBODY: NORMAL
HIV-2 AB: NORMAL

## 2021-11-08 DIAGNOSIS — M10.9 GOUT, UNSPECIFIED CAUSE, UNSPECIFIED CHRONICITY, UNSPECIFIED SITE: ICD-10-CM

## 2021-11-08 RX ORDER — ALLOPURINOL 100 MG/1
200 TABLET ORAL DAILY
Qty: 30 TABLET | Refills: 2 | Status: SHIPPED | OUTPATIENT
Start: 2021-11-08 | End: 2021-12-03

## 2021-12-03 DIAGNOSIS — M10.9 GOUT, UNSPECIFIED CAUSE, UNSPECIFIED CHRONICITY, UNSPECIFIED SITE: ICD-10-CM

## 2021-12-03 RX ORDER — ALLOPURINOL 100 MG/1
TABLET ORAL
Qty: 30 TABLET | Refills: 1 | Status: SHIPPED | OUTPATIENT
Start: 2021-12-03 | End: 2022-01-24

## 2022-01-24 DIAGNOSIS — M10.9 GOUT, UNSPECIFIED CAUSE, UNSPECIFIED CHRONICITY, UNSPECIFIED SITE: Primary | ICD-10-CM

## 2022-02-01 ENCOUNTER — TELEPHONE (OUTPATIENT)
Dept: INTERNAL MEDICINE CLINIC | Age: 57
End: 2022-02-01

## 2022-02-01 RX ORDER — INDOMETHACIN 50 MG/1
50 CAPSULE ORAL 2 TIMES DAILY WITH MEALS
Qty: 20 CAPSULE | OUTPATIENT
Start: 2022-02-01 | End: 2022-02-11

## 2022-02-01 NOTE — TELEPHONE ENCOUNTER
Please let patient know I am sorry he is in such pain and would like to help, however he needs to be seen as I need to make sure he is having gout flare.

## 2022-02-01 NOTE — TELEPHONE ENCOUNTER
Pt is having Gout Pain. He takes Allopurinol. He needs the anti-inflammatory to take with Allopurinol to reduce swelling. Pt said he can barely walk. Indomethacin 50 mg caps    [facility] Last ov 11-5-21.

## 2022-02-01 NOTE — TELEPHONE ENCOUNTER
Pt said he can not walk , he can not come in for an appointment . He was really annoyed and aggravated on the phone .

## 2022-02-02 ENCOUNTER — OFFICE VISIT (OUTPATIENT)
Dept: INTERNAL MEDICINE CLINIC | Age: 57
End: 2022-02-02
Payer: MEDICARE

## 2022-02-02 VITALS
OXYGEN SATURATION: 99 % | BODY MASS INDEX: 31.24 KG/M2 | WEIGHT: 224 LBS | TEMPERATURE: 98.1 F | DIASTOLIC BLOOD PRESSURE: 76 MMHG | HEART RATE: 107 BPM | RESPIRATION RATE: 18 BRPM | SYSTOLIC BLOOD PRESSURE: 110 MMHG

## 2022-02-02 DIAGNOSIS — M10.9 GOUT, ARTHRITIS: Primary | ICD-10-CM

## 2022-02-02 DIAGNOSIS — I10 ESSENTIAL HYPERTENSION, BENIGN: ICD-10-CM

## 2022-02-02 DIAGNOSIS — E55.9 VITAMIN D DEFICIENCY: ICD-10-CM

## 2022-02-02 DIAGNOSIS — E78.2 MIXED HYPERLIPIDEMIA: ICD-10-CM

## 2022-02-02 LAB
ALBUMIN SERPL-MCNC: 4.4 G/DL (ref 3.4–5)
ANION GAP SERPL CALCULATED.3IONS-SCNC: 17 MMOL/L (ref 3–16)
BUN BLDV-MCNC: 17 MG/DL (ref 7–20)
CALCIUM SERPL-MCNC: 9.3 MG/DL (ref 8.3–10.6)
CHLORIDE BLD-SCNC: 98 MMOL/L (ref 99–110)
CO2: 23 MMOL/L (ref 21–32)
CREAT SERPL-MCNC: 0.9 MG/DL (ref 0.9–1.3)
GFR AFRICAN AMERICAN: >60
GFR NON-AFRICAN AMERICAN: >60
GLUCOSE BLD-MCNC: 110 MG/DL (ref 70–99)
PHOSPHORUS: 3.2 MG/DL (ref 2.5–4.9)
POTASSIUM SERPL-SCNC: 3.2 MMOL/L (ref 3.5–5.1)
SODIUM BLD-SCNC: 138 MMOL/L (ref 136–145)
URIC ACID, SERUM: 7.6 MG/DL (ref 3.5–7.2)
VITAMIN D 25-HYDROXY: 21.2 NG/ML

## 2022-02-02 PROCEDURE — 36415 COLL VENOUS BLD VENIPUNCTURE: CPT | Performed by: INTERNAL MEDICINE

## 2022-02-02 PROCEDURE — 99214 OFFICE O/P EST MOD 30 MIN: CPT | Performed by: INTERNAL MEDICINE

## 2022-02-02 PROCEDURE — 3017F COLORECTAL CA SCREEN DOC REV: CPT | Performed by: INTERNAL MEDICINE

## 2022-02-02 PROCEDURE — G8417 CALC BMI ABV UP PARAM F/U: HCPCS | Performed by: INTERNAL MEDICINE

## 2022-02-02 PROCEDURE — G8427 DOCREV CUR MEDS BY ELIG CLIN: HCPCS | Performed by: INTERNAL MEDICINE

## 2022-02-02 PROCEDURE — 1036F TOBACCO NON-USER: CPT | Performed by: INTERNAL MEDICINE

## 2022-02-02 PROCEDURE — G8484 FLU IMMUNIZE NO ADMIN: HCPCS | Performed by: INTERNAL MEDICINE

## 2022-02-02 RX ORDER — ASPIRIN 81 MG/1
81 TABLET ORAL DAILY
COMMUNITY
End: 2022-05-12

## 2022-02-02 RX ORDER — INDOMETHACIN 50 MG/1
50 CAPSULE ORAL 2 TIMES DAILY WITH MEALS
Qty: 20 CAPSULE | Refills: 1 | Status: SHIPPED | OUTPATIENT
Start: 2022-02-02 | End: 2022-05-12

## 2022-02-02 RX ORDER — PANTOPRAZOLE SODIUM 20 MG/1
20 TABLET, DELAYED RELEASE ORAL
Qty: 90 TABLET | Refills: 1 | Status: SHIPPED | OUTPATIENT
Start: 2022-02-02 | End: 2022-05-12

## 2022-02-02 ASSESSMENT — ENCOUNTER SYMPTOMS
BLOOD IN STOOL: 0
VOMITING: 0
BACK PAIN: 0
ABDOMINAL PAIN: 0
SHORTNESS OF BREATH: 0
NAUSEA: 0
DIARRHEA: 0
BLURRED VISION: 0
ORTHOPNEA: 0
CONSTIPATION: 0
COUGH: 0

## 2022-02-02 NOTE — PROGRESS NOTES
Amie Brewer (:  1965) is a 55 y.o. male,Established patient, here for evaluation of the following chief complaint(s):  Gout (started  took indocin prior to control )         ASSESSMENT/PLAN:  1. Gout, arthritis    Acute exacerbation in left knee with swelling and decreased range of motion. Will evaluate uric acid level to see if needs  Allopurinol adjustment. Patient prefers to take indocin rather than colchicine, since it worked well in past.  -     indomethacin (INDOCIN) 50 MG capsule; Take 1 capsule by mouth 2 times daily (with meals) for 10 days, Disp-20 capsule, R-1Normal  -     Uric Acid  -     pantoprazole (PROTONIX) 20 MG tablet; Take 1 tablet by mouth every morning (before breakfast) Take with indocin, Disp-90 tablet, R-1Normal  2. Essential hypertension, controlled on hctz and amlodipine, continue, monitor renal.  Labs Renal Latest Ref Rng & Units 2021   BUN 7 - 20 mg/dL 13 11   Cr 0.9 - 1.3 mg/dL 1.0 1.0   K 3.5 - 5.1 mmol/L 3.6 3.9   Na 136 - 145 mmol/L 144 141       BP Readings from Last 3 Encounters:   22 110/76   21 138/82   21 (!) 162/88       -     Renal Function Panel  3. Mixed hyperlipidemia: controlled on atorvastatin 80 mg qd and no myalgias or weakness. Lab Results   Component Value Date    CHOL 193 2021     Lab Results   Component Value Date    TRIG 93 2021     Lab Results   Component Value Date    HDL 35 (L) 2021    HDL 36 (L) 2021     Lab Results   Component Value Date    LDLCALC 60 2021    LDLCALC 138 (H) 2021     Lab Results   Component Value Date    LABVLDL 22 2021    LABVLDL 19 2021     No results found for: CHOLHDLRATIO    4. Vitamin D deficiency taking 1000 units daily. -     Vitamin D 25 Hydroxy      No follow-ups on file. Subjective   SUBJECTIVE/OBJECTIVE:  Vit d 1000 un  Knee Pain   The incident occurred 12 to 24 hours ago (left knee). There was no injury mechanism.  The pain is present in the left knee. The quality of the pain is described as aching. The pain is at a severity of 8/10. The pain is severe. The pain has been constant since onset. Associated symptoms include an inability to bear weight and a loss of motion. Pertinent negatives include no loss of sensation, muscle weakness, numbness or tingling. He reports no foreign bodies present. The symptoms are aggravated by weight bearing, palpation and movement. He has tried nothing for the symptoms. The treatment provided no relief. Hypertension  This is a chronic problem. The current episode started more than 1 year ago. The problem has been gradually improving since onset. The problem is controlled. Pertinent negatives include no anxiety, blurred vision, chest pain, headaches, malaise/fatigue, neck pain, orthopnea, palpitations, peripheral edema, PND, shortness of breath or sweats. There are no associated agents to hypertension. Risk factors for coronary artery disease include male gender and dyslipidemia. Past treatments include diuretics and calcium channel blockers. The current treatment provides significant improvement. There are no compliance problems. There is no history of angina, kidney disease, CAD/MI, CVA, heart failure, left ventricular hypertrophy, PVD or retinopathy. Review of Systems   Constitutional: Negative for chills, fever and malaise/fatigue. Eyes: Negative for blurred vision. Respiratory: Negative for cough and shortness of breath. Cardiovascular: Negative for chest pain, palpitations, orthopnea, leg swelling and PND. Gastrointestinal: Negative for abdominal pain, blood in stool, constipation, diarrhea, nausea and vomiting. Genitourinary: Negative for difficulty urinating. Musculoskeletal: Negative for arthralgias, back pain, myalgias and neck pain. Skin: Negative for rash. Neurological: Negative for dizziness, tingling, syncope, numbness and headaches.    Psychiatric/Behavioral: Negative for sleep disturbance. Objective   Physical Exam  Constitutional:       General: He is not in acute distress. Appearance: Normal appearance. He is normal weight. He is not ill-appearing, toxic-appearing or diaphoretic. Eyes:      Extraocular Movements: Extraocular movements intact. Conjunctiva/sclera: Conjunctivae normal.      Pupils: Pupils are equal, round, and reactive to light. Neck:      Vascular: No carotid bruit. Cardiovascular:      Heart sounds: Normal heart sounds. Pulmonary:      Effort: Pulmonary effort is normal.      Breath sounds: Normal breath sounds. Abdominal:      General: Abdomen is flat. Bowel sounds are normal. There is no distension. Palpations: Abdomen is soft. There is no mass. Tenderness: There is no abdominal tenderness. There is no right CVA tenderness, guarding or rebound. Hernia: No hernia is present. Musculoskeletal:         General: Swelling present. Cervical back: Normal range of motion and neck supple. No rigidity or tenderness. Comments: Swollen warm left knee with limited range of motion by 50%, patient states it is similar to flare up in the past.   Lymphadenopathy:      Cervical: No cervical adenopathy. Skin:     General: Skin is warm and dry. Neurological:      General: No focal deficit present. Mental Status: He is alert and oriented to person, place, and time. Cranial Nerves: No cranial nerve deficit. Coordination: Coordination normal.   Psychiatric:         Mood and Affect: Mood normal.         Behavior: Behavior normal.         Thought Content: Thought content normal.         Judgment: Judgment normal.                An electronic signature was used to authenticate this note.     --Edwin Morgan MD

## 2022-02-03 DIAGNOSIS — M10.9 GOUT, UNSPECIFIED CAUSE, UNSPECIFIED CHRONICITY, UNSPECIFIED SITE: Primary | ICD-10-CM

## 2022-02-03 DIAGNOSIS — M10.9 GOUT, UNSPECIFIED CAUSE, UNSPECIFIED CHRONICITY, UNSPECIFIED SITE: ICD-10-CM

## 2022-02-03 DIAGNOSIS — E87.6 HYPOKALEMIA: Primary | ICD-10-CM

## 2022-02-03 RX ORDER — ALLOPURINOL 300 MG/1
300 TABLET ORAL DAILY
Qty: 90 TABLET | Refills: 3 | Status: SHIPPED | OUTPATIENT
Start: 2022-02-03 | End: 2022-05-13

## 2022-02-03 RX ORDER — POTASSIUM CHLORIDE 20 MEQ/1
20 TABLET, EXTENDED RELEASE ORAL DAILY
Qty: 30 TABLET | Refills: 0 | Status: SHIPPED | OUTPATIENT
Start: 2022-02-03 | End: 2022-03-07

## 2022-02-03 RX ORDER — ALLOPURINOL 300 MG/1
300 TABLET ORAL DAILY
Qty: 90 TABLET | Refills: 0 | Status: SHIPPED | OUTPATIENT
Start: 2022-02-03 | End: 2022-02-03

## 2022-02-18 DIAGNOSIS — M10.9 GOUT, ARTHRITIS: ICD-10-CM

## 2022-02-18 RX ORDER — INDOMETHACIN 50 MG/1
CAPSULE ORAL
Qty: 20 CAPSULE | OUTPATIENT
Start: 2022-02-18

## 2022-02-18 NOTE — TELEPHONE ENCOUNTER
Last ov 02/02/22  Future Appointments   Date Time Provider [place]   5/2/2022  9:15 AM Severo Favre, [address]

## 2022-03-04 DIAGNOSIS — I10 PRIMARY HYPERTENSION: ICD-10-CM

## 2022-03-04 RX ORDER — HYDROCHLOROTHIAZIDE 25 MG/1
25 TABLET ORAL DAILY
Qty: 90 TABLET | Refills: 1 | Status: SHIPPED | OUTPATIENT
Start: 2022-03-04 | End: 2022-09-06

## 2022-03-04 RX ORDER — AMLODIPINE BESYLATE 10 MG/1
10 TABLET ORAL DAILY
Qty: 90 TABLET | Refills: 1 | Status: SHIPPED | OUTPATIENT
Start: 2022-03-04 | End: 2022-09-06

## 2022-03-04 NOTE — TELEPHONE ENCOUNTER
Last seen: 2/22/22        Future Appointments   Date Time Provider [place]   5/2/2022  9:15 AM Heather Dye, [address]

## 2022-03-06 DIAGNOSIS — I63.89 CEREBROVASCULAR ACCIDENT (CVA) DUE TO OTHER MECHANISM (HCC): ICD-10-CM

## 2022-03-06 DIAGNOSIS — I65.03 VERTEBRAL ARTERY STENOSIS, BILATERAL: ICD-10-CM

## 2022-03-06 DIAGNOSIS — E87.6 HYPOKALEMIA: ICD-10-CM

## 2022-03-07 DIAGNOSIS — M10.9 GOUT, UNSPECIFIED CAUSE, UNSPECIFIED CHRONICITY, UNSPECIFIED SITE: ICD-10-CM

## 2022-03-07 RX ORDER — ATORVASTATIN CALCIUM 80 MG/1
TABLET, FILM COATED ORAL
Qty: 90 TABLET | Refills: 1 | Status: SHIPPED | OUTPATIENT
Start: 2022-03-07 | End: 2022-05-12

## 2022-03-07 RX ORDER — ALLOPURINOL 100 MG/1
TABLET ORAL
Qty: 30 TABLET | Refills: 1 | OUTPATIENT
Start: 2022-03-07

## 2022-03-07 RX ORDER — POTASSIUM CHLORIDE 20 MEQ/1
20 TABLET, EXTENDED RELEASE ORAL DAILY
Qty: 30 TABLET | Refills: 2 | Status: SHIPPED | OUTPATIENT
Start: 2022-03-07 | End: 2022-06-10

## 2022-03-07 RX ORDER — CLOPIDOGREL BISULFATE 75 MG/1
75 TABLET ORAL DAILY
Qty: 90 TABLET | Refills: 1 | Status: SHIPPED | OUTPATIENT
Start: 2022-03-07 | End: 2022-09-08

## 2022-03-07 NOTE — TELEPHONE ENCOUNTER
Medication:   Requested Prescriptions     Pending Prescriptions Disp Refills    allopurinol (ZYLOPRIM) 100 MG tablet [Pharmacy Med Name: ALLOPURINOL 100MG TABLETS] 30 tablet 1     Sig: TAKE 2 TABLETS BY MOUTH DAILY        Last Filled:      Patient Phone Number: [phone] (home)     Last appt: Visit date not found   Next appt: Visit date not found    Last OARRS: No flowsheet data found.

## 2022-03-07 NOTE — TELEPHONE ENCOUNTER
LAST OFFICE VISIT :02/02/2022    Future Appointments   Date Time Provider [place]   5/2/2022  9:15 AM Jass Sapp, [address]

## 2022-03-07 NOTE — TELEPHONE ENCOUNTER
LAST OFFICE VISIT :11/05/2021    Future Appointments   Date Time Provider [place]   5/2/2022  9:15 AM Haile Shen, [address]

## 2022-05-12 ENCOUNTER — OFFICE VISIT (OUTPATIENT)
Dept: INTERNAL MEDICINE CLINIC | Age: 57
End: 2022-05-12
Payer: MEDICARE

## 2022-05-12 VITALS
DIASTOLIC BLOOD PRESSURE: 82 MMHG | HEART RATE: 96 BPM | WEIGHT: 240.25 LBS | BODY MASS INDEX: 33.51 KG/M2 | TEMPERATURE: 98.1 F | OXYGEN SATURATION: 97 % | SYSTOLIC BLOOD PRESSURE: 122 MMHG

## 2022-05-12 DIAGNOSIS — E78.2 MIXED HYPERLIPIDEMIA: ICD-10-CM

## 2022-05-12 DIAGNOSIS — E87.6 HYPOKALEMIA: ICD-10-CM

## 2022-05-12 DIAGNOSIS — I63.89 CEREBROVASCULAR ACCIDENT (CVA) DUE TO OTHER MECHANISM (HCC): ICD-10-CM

## 2022-05-12 DIAGNOSIS — E79.0 HYPERURICEMIA: ICD-10-CM

## 2022-05-12 DIAGNOSIS — L72.11 PILAR CYSTS: ICD-10-CM

## 2022-05-12 DIAGNOSIS — I65.03 VERTEBRAL ARTERY STENOSIS, BILATERAL: ICD-10-CM

## 2022-05-12 DIAGNOSIS — I10 PRIMARY HYPERTENSION: Primary | ICD-10-CM

## 2022-05-12 LAB
ANION GAP SERPL CALCULATED.3IONS-SCNC: 17 MMOL/L (ref 3–16)
BUN BLDV-MCNC: 16 MG/DL (ref 7–20)
CALCIUM SERPL-MCNC: 10.2 MG/DL (ref 8.3–10.6)
CHLORIDE BLD-SCNC: 102 MMOL/L (ref 99–110)
CO2: 23 MMOL/L (ref 21–32)
CREAT SERPL-MCNC: 1.4 MG/DL (ref 0.9–1.3)
GFR AFRICAN AMERICAN: >60
GFR NON-AFRICAN AMERICAN: 52
GLUCOSE BLD-MCNC: 86 MG/DL (ref 70–99)
POTASSIUM SERPL-SCNC: 3.1 MMOL/L (ref 3.5–5.1)
SODIUM BLD-SCNC: 142 MMOL/L (ref 136–145)
URIC ACID, SERUM: 8.3 MG/DL (ref 3.5–7.2)

## 2022-05-12 PROCEDURE — 1036F TOBACCO NON-USER: CPT | Performed by: NURSE PRACTITIONER

## 2022-05-12 PROCEDURE — 36415 COLL VENOUS BLD VENIPUNCTURE: CPT | Performed by: NURSE PRACTITIONER

## 2022-05-12 PROCEDURE — G8417 CALC BMI ABV UP PARAM F/U: HCPCS | Performed by: NURSE PRACTITIONER

## 2022-05-12 PROCEDURE — 99214 OFFICE O/P EST MOD 30 MIN: CPT | Performed by: NURSE PRACTITIONER

## 2022-05-12 PROCEDURE — 3017F COLORECTAL CA SCREEN DOC REV: CPT | Performed by: NURSE PRACTITIONER

## 2022-05-12 PROCEDURE — G8427 DOCREV CUR MEDS BY ELIG CLIN: HCPCS | Performed by: NURSE PRACTITIONER

## 2022-05-12 RX ORDER — ATORVASTATIN CALCIUM 40 MG/1
40 TABLET, FILM COATED ORAL DAILY
Qty: 90 TABLET | Refills: 1 | Status: SHIPPED | OUTPATIENT
Start: 2022-05-12

## 2022-05-12 ASSESSMENT — ENCOUNTER SYMPTOMS
VOMITING: 0
NAUSEA: 0
BLOOD IN STOOL: 0
DIARRHEA: 0
CONSTIPATION: 0
COUGH: 0
SHORTNESS OF BREATH: 0
ABDOMINAL PAIN: 0

## 2022-05-12 NOTE — PROGRESS NOTES
Date: 5/12/2022                                               Subjective/Objective:     Chief Complaint   Patient presents with    Hypertension     3 month follow up    Hyperlipidemia       HPI     Destiny Holbrook is a 64 yo male, visit today for follow up on hyperlipidemia and hypertension. Last seen by myself 11/5/2021. CVA 4/2021 completed DAPT. 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 Hyperlipidemia is currently managed on atorvastatin. Denies myalgias, GI upset. Would like to try reducing his dose of medication. History of hypertension, currently managed on amlodipine and HCTZ. Does monitor his blood pressure at home. Reports has been 120/80s. Takes his blood pressure medications at night. Denies medication side effects. Denies chest pain, SOB.      He had incidental finding of left thyroid nodule with normal TSH, he had Thyroid US and CONDE uptake scan ordered on discharge that was not completed. History of pilar cysts on scalp. Would like referral to dermatology. Age/Gender Health Maintenance     Colon Cancer Screening - colonoscopy at  over 10 years, normal ago per patient - has referral. Father had colon CA.    Never smoker     PSA screening - nl 11/2021            Patient Active Problem List    Diagnosis Date Noted    Vitamin D deficiency 02/02/2022    Cerebrovascular accident (CVA) associated with severe acute respiratory syndrome coronavirus 2 (SARS-CoV-2) infection ([facility] [address] 04/23/2021    HTN (hypertension) 04/22/2021    Mixed hyperlipidemia 08/26/2015    Gout, arthritis 08/05/2015    Essential hypertension, benign 04/23/2015    Hyperuricemia 63/66/7320    Lichenification and lichen simplex chronicus 02/18/2014    Seborrheic dermatitis 02/18/2014    Gallstones 02/04/2014    Allergic rhinitis 07/24/2012       Past Medical History:   Diagnosis Date    Cerebrovascular disease     CVA (cerebral vascular accident) ([facility] [address]     Gout     Hyperlipidemia     Hypertension        History reviewed. No pertinent surgical history. Office Visit on 02/02/2022   Component Date Value Ref Range Status    Uric Acid, Serum 02/02/2022 7.6* 3.5 - 7.2 mg/dL Final    Sodium 02/02/2022 138  136 - 145 mmol/L Final    Potassium 02/02/2022 3.2* 3.5 - 5.1 mmol/L Final    Chloride 02/02/2022 98* 99 - 110 mmol/L Final    CO2 02/02/2022 23  21 - 32 mmol/L Final    Anion Gap 02/02/2022 17* 3 - 16 Final    Glucose 02/02/2022 110* 70 - 99 mg/dL Final    BUN 02/02/2022 17  7 - 20 mg/dL Final    CREATININE 02/02/2022 0.9  0.9 - 1.3 mg/dL Final    GFR Non- 02/02/2022 >60  >60 Final    Comment: >60 mL/min/1.73m2 EGFR, calc. for ages 25 and older using the  MDRD formula (not corrected for weight), is valid for stable  renal function.  GFR  02/02/2022 >60  >60 Final    Comment: Chronic Kidney Disease: less than 60 ml/min/1.73 sq.m. Kidney Failure: less than 15 ml/min/1.73 sq.m. Results valid for patients 18 years and older.  Calcium 02/02/2022 9.3  8.3 - 10.6 mg/dL Final    Phosphorus 02/02/2022 3.2  2.5 - 4.9 mg/dL Final    Albumin 02/02/2022 4.4  3.4 - 5.0 g/dL Final    Vit D, 25-Hydroxy 02/02/2022 21.2* >=30 ng/mL Final    Comment: <=20 ng/mL. ........... Shaaron Money Deficient  21-29 ng/mL. ......... Shaaron Money Insufficient  >=30 ng/mL. ........ Shaaron Money Sufficient         Family History   Problem Relation Age of Onset    Cancer Father         colon cancer       Current Outpatient Medications   Medication Sig Dispense Refill    atorvastatin (LIPITOR) 40 MG tablet Take 1 tablet by mouth daily 90 tablet 1    clopidogrel (PLAVIX) 75 MG tablet TAKE 1 TABLET BY MOUTH DAILY 90 tablet 1    potassium chloride (KLOR-CON M) 20 MEQ extended release tablet TAKE 1 TABLET BY MOUTH DAILY 30 tablet 2    amLODIPine (NORVASC) 10 MG tablet TAKE 1 TABLET BY MOUTH DAILY 90 tablet 1    hydroCHLOROthiazide (HYDRODIURIL) 25 MG tablet TAKE 1 TABLET BY MOUTH DAILY 90 tablet 1    allopurinol (ZYLOPRIM) 300 MG tablet Take 1 tablet by mouth daily 90 tablet 3     No current facility-administered medications for this visit. No Known Allergies    Review of Systems   Constitutional: Negative for chills and fever. Respiratory: Negative for cough and shortness of breath. Cardiovascular: Negative for chest pain and leg swelling. Gastrointestinal: Negative for abdominal pain, blood in stool, constipation, diarrhea, nausea and vomiting. Genitourinary: Negative for difficulty urinating. Neurological: Negative for dizziness and syncope. Vitals:  /82 (Site: Left Upper Arm, Position: Sitting, Cuff Size: Large Adult)   Pulse 96   Temp 98.1 °F (36.7 °C) (Oral)   Wt 240 lb 4 oz (109 kg)   SpO2 97%   BMI 33.51 kg/m²     Physical Exam  Vitals reviewed. Constitutional:       General: He is not in acute distress. HENT:      Head: Normocephalic and atraumatic. Cardiovascular:      Rate and Rhythm: Normal rate and regular rhythm. Heart sounds: Normal heart sounds. Pulmonary:      Effort: Pulmonary effort is normal.      Breath sounds: Normal breath sounds. Skin:     General: Skin is warm and dry. Neurological:      General: No focal deficit present. Mental Status: He is alert and oriented to person, place, and time. Psychiatric:         Mood and Affect: Mood normal.         Behavior: Behavior normal.         Thought Content: Thought content normal.         Judgment: Judgment normal.         Assessment/Plan     1. Primary hypertension: stable. - Basic Metabolic Panel; Future   -Continue amlodipine and hydrochlorothiazide    2. Hyperuricemia  - Uric Acid; Future  - Basic Metabolic Panel; Future   -Continue allopurinol, check uric acid level and adjust plan as necessary    3. Hypokalemia: currently supplemented with 10 mEq potassium daily.   - Basic Metabolic Panel; Future    4.  Cerebrovascular accident (CVA) due to other mechanism [facility]):  - atorvastatin (LIPITOR) 40 MG tablet; Take 1 tablet by mouth daily  Dispense: 90 tablet; Refill: 1   -Continues on Plavix   -Patient wanting to stop statin due to improved LDL. Reviewed with patient the importance of taking statin despite LDL at goal.  Patient wanting to decrease statin dose. Again advised patient to stay on current dose however he desires to decrease to 40 mg daily, feels that he may have some brain fog from the statin and have follow-up lipid panel in 3 months time.   -Follow up 3 months with fasting labs    5. Vertebral artery stenosis, bilateral  - atorvastatin (LIPITOR) 40 MG tablet; Take 1 tablet by mouth daily  Dispense: 90 tablet; Refill: 1   - Continues on statin and plavix    6. Mixed hyperlipidemia  - atorvastatin (LIPITOR) 40 MG tablet; Take 1 tablet by mouth daily  Dispense: 90 tablet; Refill: 1   - Patient wanting to stop statin due to improved LDL. Reviewed with patient the importance of taking statin despite LDL at goal.  Patient wanting to decrease statin dose. Again advised patient to stay on current dose however he desires to decrease to 40 mg daily, feels that he may have some brain fog from the statin and have follow-up lipid panel in 3 months time.   -Follow up 3 months with fasting labs    7. Pilar cysts  - External Referral To Dermatology      Orders Placed This Encounter   Procedures    Uric Acid     Standing Status:   Future     Number of Occurrences:   1     Standing Expiration Date:   5/12/2023    Basic Metabolic Panel     Standing Status:   Future     Number of Occurrences:   1     Standing Expiration Date:   5/12/2023   Nate Gaytan External Referral To Dermatology     Referral Priority:   Routine     Referral Type:   Eval and Treat     Referral Reason:   Specialty Services Required     Requested Specialty:   Dermatology     Number of Visits Requested:   1       Return in about 3 months (around 8/12/2022) for hyperlipidemia .  OR sooner with questions, concerns, worsening symptoms    ELIZABETH Carla Erum, APRN  5/12/2022  4:41 PM    Discussed use, benefit, and side effects of prescribed medications. Barriers to medication compliance addressed. Discussed all ordered testing and labs. All patient questions answered. Patient agreeable with plan above. Please note that this chart was generated using dragon dictation software. Although every effort was made to ensure the accuracy of this automated transcription, some errors in transcription may have occurred.

## 2022-05-13 DIAGNOSIS — M10.9 GOUT, UNSPECIFIED CAUSE, UNSPECIFIED CHRONICITY, UNSPECIFIED SITE: ICD-10-CM

## 2022-05-13 DIAGNOSIS — E79.0 HYPERURICEMIA: Primary | ICD-10-CM

## 2022-05-13 RX ORDER — ALLOPURINOL 100 MG/1
TABLET ORAL
Qty: 120 TABLET | Refills: 1 | Status: SHIPPED | OUTPATIENT
Start: 2022-05-13 | End: 2022-08-12

## 2022-05-17 ENCOUNTER — TELEPHONE (OUTPATIENT)
Dept: INTERNAL MEDICINE CLINIC | Age: 57
End: 2022-05-17

## 2022-05-17 NOTE — TELEPHONE ENCOUNTER
Talked to patient and he is going to take 300 mg in the morning also he is not taking any potassium he has been only eating banana's. .. made a appointment for 4 weeks .

## 2022-06-10 DIAGNOSIS — E87.6 HYPOKALEMIA: ICD-10-CM

## 2022-06-10 RX ORDER — POTASSIUM CHLORIDE 20 MEQ/1
20 TABLET, EXTENDED RELEASE ORAL DAILY
Qty: 30 TABLET | Refills: 0 | Status: SHIPPED | OUTPATIENT
Start: 2022-06-10 | End: 2022-07-08

## 2022-07-03 DIAGNOSIS — M10.9 GOUT, UNSPECIFIED CAUSE, UNSPECIFIED CHRONICITY, UNSPECIFIED SITE: ICD-10-CM

## 2022-07-05 RX ORDER — ALLOPURINOL 300 MG/1
300 TABLET ORAL DAILY
Qty: 90 TABLET | Refills: 1 | Status: SHIPPED | OUTPATIENT
Start: 2022-07-05 | End: 2022-08-12

## 2022-07-05 NOTE — TELEPHONE ENCOUNTER
Future Appointments   Date Time Provider [place]   8/12/2022 10:00 AM Sugey Bright, [address]     Last appt on 5.12.2022

## 2022-07-08 DIAGNOSIS — E87.6 HYPOKALEMIA: ICD-10-CM

## 2022-07-08 RX ORDER — POTASSIUM CHLORIDE 20 MEQ/1
20 TABLET, EXTENDED RELEASE ORAL DAILY
Qty: 30 TABLET | Refills: 2 | Status: SHIPPED | OUTPATIENT
Start: 2022-07-08 | End: 2022-08-12

## 2022-07-08 NOTE — TELEPHONE ENCOUNTER
LAST OFFICE VISIT :05/12/2022    Future Appointments   Date Time Provider [place]   8/12/2022 10:00 AM Sugey Bright, [address]

## 2022-08-05 DIAGNOSIS — M10.9 GOUT, ARTHRITIS: ICD-10-CM

## 2022-08-05 RX ORDER — PANTOPRAZOLE SODIUM 20 MG/1
TABLET, DELAYED RELEASE ORAL
Qty: 90 TABLET | OUTPATIENT
Start: 2022-08-05

## 2022-08-05 NOTE — TELEPHONE ENCOUNTER
Last office visit : 05/12/2022    Future Appointments   Date Time Provider [place]   8/12/2022 10:00 AM Willis Vizcaino, [address]

## 2022-08-12 ENCOUNTER — OFFICE VISIT (OUTPATIENT)
Dept: INTERNAL MEDICINE CLINIC | Age: 57
End: 2022-08-12
Payer: MEDICARE

## 2022-08-12 VITALS
TEMPERATURE: 98.2 F | WEIGHT: 245.25 LBS | SYSTOLIC BLOOD PRESSURE: 122 MMHG | BODY MASS INDEX: 34.21 KG/M2 | HEART RATE: 84 BPM | OXYGEN SATURATION: 97 % | DIASTOLIC BLOOD PRESSURE: 84 MMHG

## 2022-08-12 DIAGNOSIS — M10.9 GOUT, ARTHRITIS: ICD-10-CM

## 2022-08-12 DIAGNOSIS — M10.9 GOUT, UNSPECIFIED CAUSE, UNSPECIFIED CHRONICITY, UNSPECIFIED SITE: ICD-10-CM

## 2022-08-12 DIAGNOSIS — I10 PRIMARY HYPERTENSION: Primary | ICD-10-CM

## 2022-08-12 DIAGNOSIS — E79.0 HYPERURICEMIA: ICD-10-CM

## 2022-08-12 DIAGNOSIS — E04.1 THYROID NODULE: ICD-10-CM

## 2022-08-12 DIAGNOSIS — E87.6 HYPOKALEMIA: ICD-10-CM

## 2022-08-12 DIAGNOSIS — E78.2 MIXED HYPERLIPIDEMIA: ICD-10-CM

## 2022-08-12 LAB
ANION GAP SERPL CALCULATED.3IONS-SCNC: 16 MMOL/L (ref 3–16)
BUN BLDV-MCNC: 12 MG/DL (ref 7–20)
CALCIUM SERPL-MCNC: 9.3 MG/DL (ref 8.3–10.6)
CHLORIDE BLD-SCNC: 102 MMOL/L (ref 99–110)
CHOLESTEROL, FASTING: 138 MG/DL (ref 0–199)
CO2: 24 MMOL/L (ref 21–32)
CREAT SERPL-MCNC: 1 MG/DL (ref 0.9–1.3)
GFR AFRICAN AMERICAN: >60
GFR NON-AFRICAN AMERICAN: >60
GLUCOSE BLD-MCNC: 110 MG/DL (ref 70–99)
HDLC SERPL-MCNC: 37 MG/DL (ref 40–60)
LDL CHOLESTEROL CALCULATED: 76 MG/DL
POTASSIUM SERPL-SCNC: 3.2 MMOL/L (ref 3.5–5.1)
SODIUM BLD-SCNC: 142 MMOL/L (ref 136–145)
TRIGLYCERIDE, FASTING: 125 MG/DL (ref 0–150)
URIC ACID, SERUM: 10 MG/DL (ref 3.5–7.2)
VLDLC SERPL CALC-MCNC: 25 MG/DL

## 2022-08-12 PROCEDURE — 36415 COLL VENOUS BLD VENIPUNCTURE: CPT | Performed by: NURSE PRACTITIONER

## 2022-08-12 PROCEDURE — G8427 DOCREV CUR MEDS BY ELIG CLIN: HCPCS | Performed by: NURSE PRACTITIONER

## 2022-08-12 PROCEDURE — G8417 CALC BMI ABV UP PARAM F/U: HCPCS | Performed by: NURSE PRACTITIONER

## 2022-08-12 PROCEDURE — 1036F TOBACCO NON-USER: CPT | Performed by: NURSE PRACTITIONER

## 2022-08-12 PROCEDURE — 99214 OFFICE O/P EST MOD 30 MIN: CPT | Performed by: NURSE PRACTITIONER

## 2022-08-12 PROCEDURE — 3017F COLORECTAL CA SCREEN DOC REV: CPT | Performed by: NURSE PRACTITIONER

## 2022-08-12 RX ORDER — POTASSIUM CHLORIDE 20 MEQ/1
20 TABLET, EXTENDED RELEASE ORAL 2 TIMES DAILY
Qty: 180 TABLET | Refills: 1 | Status: SHIPPED | OUTPATIENT
Start: 2022-08-12 | End: 2022-10-24

## 2022-08-12 RX ORDER — ALLOPURINOL 100 MG/1
100 TABLET ORAL 3 TIMES DAILY
Qty: 270 TABLET | Refills: 1 | Status: SHIPPED | OUTPATIENT
Start: 2022-08-12 | End: 2022-11-03

## 2022-08-12 ASSESSMENT — ENCOUNTER SYMPTOMS
ABDOMINAL PAIN: 0
NAUSEA: 0
COUGH: 0
VOMITING: 0
CONSTIPATION: 0
SHORTNESS OF BREATH: 0
DIARRHEA: 0

## 2022-08-12 ASSESSMENT — PATIENT HEALTH QUESTIONNAIRE - PHQ9
SUM OF ALL RESPONSES TO PHQ9 QUESTIONS 1 & 2: 0
SUM OF ALL RESPONSES TO PHQ QUESTIONS 1-9: 0
2. FEELING DOWN, DEPRESSED OR HOPELESS: 0
1. LITTLE INTEREST OR PLEASURE IN DOING THINGS: 0
SUM OF ALL RESPONSES TO PHQ QUESTIONS 1-9: 0

## 2022-08-12 NOTE — PROGRESS NOTES
Date: 8/12/2022                                               Subjective/Objective:     Chief Complaint   Patient presents with    Hypertension     3 month follow up    Hyperlipidemia       HPI    Sia Guerrero is a 64 yo male, visit today for follow up on chronic medical conditions. Last seen 5/12/2022. He denies concerns today. States overall he is feeling very well. CVA 4/2021 completed DAPT. 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 Hyperlipidemia is currently managed on atorvastatin. Denies myalgias, GI upset. History of hypertension, currently managed on amlodipine and HCTZ. Does monitor his blood pressure at home. Reports has been 120/80s, doesn't have readings with him. Takes his blood pressure medications at night. Denies chest pain, SOB. He is currently taking allopurinol 100 mg twice daily, did not increase his allopurinol as previously directed Has not had follow-up uric acid level drawn. Denies medication side effects. He had incidental finding of left thyroid nodule with normal TSH, he had Thyroid US and CONDE uptake scan ordered on discharge that has not been completed. Age/Gender Health Maintenance     Colon Cancer Screening - colonoscopy at  over 10 years, normal ago per patient - has referral. Father had colon CA.   Never smoker     PSA screening - nl 11/2021               Patient Active Problem List    Diagnosis Date Noted    Vitamin D deficiency 02/02/2022    Cerebrovascular accident (CVA) associated with severe acute respiratory syndrome coronavirus 2 (SARS-CoV-2) infection ([facility] [address] 04/23/2021    HTN (hypertension) 04/22/2021    Mixed hyperlipidemia 08/26/2015    Gout, arthritis 08/05/2015    Essential hypertension, benign 04/23/2015    Hyperuricemia 98/14/2652    Lichenification and lichen simplex chronicus 02/18/2014    Seborrheic dermatitis 02/18/2014    Gallstones 02/04/2014    Allergic rhinitis 07/24/2012       Past Medical History:   Diagnosis Date    Cerebrovascular disease     CVA (cerebral vascular accident) ([facility] [address]     Gout     Hyperlipidemia     Hypertension        No past surgical history on file. Office Visit on 05/12/2022   Component Date Value Ref Range Status    Uric Acid, Serum 05/12/2022 8.3 (A) 3.5 - 7.2 mg/dL Final    Sodium 05/12/2022 142  136 - 145 mmol/L Final    Potassium 05/12/2022 3.1 (A) 3.5 - 5.1 mmol/L Final    Chloride 05/12/2022 102  99 - 110 mmol/L Final    CO2 05/12/2022 23  21 - 32 mmol/L Final    Anion Gap 05/12/2022 17 (A) 3 - 16 Final    Glucose 05/12/2022 86  70 - 99 mg/dL Final    BUN 05/12/2022 16  7 - 20 mg/dL Final    Creatinine 05/12/2022 1.4 (A) 0.9 - 1.3 mg/dL Final    GFR Non- 05/12/2022 52 (A) >60 Final    Comment: >60 mL/min/1.73m2 EGFR, calc. for ages 25 and older using the  MDRD formula (not corrected for weight), is valid for stable  renal function. GFR  05/12/2022 >60  >60 Final    Comment: Chronic Kidney Disease: less than 60 ml/min/1.73 sq.m. Kidney Failure: less than 15 ml/min/1.73 sq.m. Results valid for patients 18 years and older. Calcium 05/12/2022 10.2  8.3 - 10.6 mg/dL Final       Family History   Problem Relation Age of Onset    Cancer Father         colon cancer       Current Outpatient Medications   Medication Sig Dispense Refill    potassium chloride (KLOR-CON M) 20 MEQ extended release tablet TAKE 1 TABLET BY MOUTH DAILY 30 tablet 2    allopurinol (ZYLOPRIM) 100 MG tablet Take 3 tablets by mouth daily AND 1 tablet every evening.  120 tablet 1    atorvastatin (LIPITOR) 40 MG tablet Take 1 tablet by mouth daily 90 tablet 1    clopidogrel (PLAVIX) 75 MG tablet TAKE 1 TABLET BY MOUTH DAILY 90 tablet 1    amLODIPine (NORVASC) 10 MG tablet TAKE 1 TABLET BY MOUTH DAILY 90 tablet 1    hydroCHLOROthiazide (HYDRODIURIL) 25 MG tablet TAKE 1 TABLET BY MOUTH DAILY 90 tablet 1     No current facility-administered medications for this visit. No Known Allergies    Review of Systems   Constitutional:  Negative for chills and fever. Respiratory:  Negative for cough and shortness of breath. Cardiovascular:  Negative for chest pain and leg swelling. Gastrointestinal:  Negative for abdominal pain, constipation, diarrhea, nausea and vomiting. Genitourinary:  Negative for difficulty urinating. Neurological:  Negative for dizziness, syncope and headaches. Psychiatric/Behavioral:  Negative for sleep disturbance. Vitals:  /84 (Site: Left Upper Arm, Position: Sitting, Cuff Size: Large Adult)   Pulse 84   Temp 98.2 °F (36.8 °C) (Oral)   Wt 245 lb 4 oz (111.2 kg)   SpO2 97%   BMI 34.21 kg/m²     Physical Exam  Vitals reviewed. Constitutional:       General: He is not in acute distress. HENT:      Head: Normocephalic and atraumatic. Cardiovascular:      Rate and Rhythm: Normal rate and regular rhythm. Heart sounds: Normal heart sounds. Pulmonary:      Effort: Pulmonary effort is normal.      Breath sounds: Normal breath sounds. Abdominal:      General: Bowel sounds are normal. There is no distension. Palpations: Abdomen is soft. Tenderness: There is no abdominal tenderness. Skin:     General: Skin is warm and dry. Neurological:      General: No focal deficit present. Mental Status: He is alert and oriented to person, place, and time. Psychiatric:         Mood and Affect: Mood normal.         Behavior: Behavior normal.         Thought Content: Thought content normal.         Judgment: Judgment normal.       Assessment/Plan     1. Primary hypertension: Stable. -Continue amlodipine and hydrochlorothiazide   -Follow-up 6-month    2. Hyperuricemia  - Uric Acid; Future   -Check uric acid level, continue current dose of allopurinol and adjust accordingly    3. Gout, arthritis: Without recent flare  - Uric Acid; Future   -Check uric acid level and adjust allopurinol as necessary    4. Hypokalemia:   - Basic Metabolic Panel; Future   -Continue potassium supplement, check BMP and adjust plan as necessary    5. Thyroid nodule: Patient advised he needs to schedule thyroid ultrasound and uptake scan, has order    6. Mixed hyperlipidemia  - Lipid, Fasting; Future   -Continue atorvastatin, this was reduced to 40 mg due to patient choice. Check fasting lipid panel and adjust plan as necessary. Orders Placed This Encounter   Procedures    Uric Acid     Standing Status:   Future     Number of Occurrences:   1     Standing Expiration Date:   3/72/7103    Basic Metabolic Panel     Standing Status:   Future     Number of Occurrences:   1     Standing Expiration Date:   8/12/2023    Lipid, Fasting     Standing Status:   Future     Number of Occurrences:   1     Standing Expiration Date:   8/12/2023       Return in about 6 months (around 2/12/2023) for hypertension. OR sooner with questions, concerns, worsening symptoms    RELL INGRAM  8/12/2022  10:15 AM    Discussed use, benefit, and side effects of prescribed medications. Barriers to medication compliance addressed. Discussed all ordered testing and labs. All patient questions answered. Patient agreeable with plan above. Please note that this chart was generated using dragon dictation software. Although every effort was made to ensure the accuracy of this automated transcription, some errors in transcription may have occurred.

## 2022-09-06 DIAGNOSIS — I10 PRIMARY HYPERTENSION: ICD-10-CM

## 2022-09-06 RX ORDER — AMLODIPINE BESYLATE 10 MG/1
10 TABLET ORAL DAILY
Qty: 90 TABLET | Refills: 0 | Status: SHIPPED | OUTPATIENT
Start: 2022-09-06

## 2022-09-06 RX ORDER — HYDROCHLOROTHIAZIDE 25 MG/1
25 TABLET ORAL DAILY
Qty: 90 TABLET | Refills: 0 | Status: SHIPPED | OUTPATIENT
Start: 2022-09-06

## 2022-09-06 NOTE — TELEPHONE ENCOUNTER
Last office visit 8/12/22          Future Appointments   Date Time Provider [place]   2/13/2023 10:00 AM Ranulfo Plummer, [address]

## 2022-09-08 DIAGNOSIS — I63.89 CEREBROVASCULAR ACCIDENT (CVA) DUE TO OTHER MECHANISM (HCC): ICD-10-CM

## 2022-09-08 DIAGNOSIS — I65.03 VERTEBRAL ARTERY STENOSIS, BILATERAL: ICD-10-CM

## 2022-09-08 RX ORDER — CLOPIDOGREL BISULFATE 75 MG/1
75 TABLET ORAL DAILY
Qty: 90 TABLET | Refills: 1 | Status: SHIPPED | OUTPATIENT
Start: 2022-09-08

## 2022-09-08 NOTE — TELEPHONE ENCOUNTER
Last office visit 8/12/22        Future Appointments   Date Time Provider [place]   2/13/2023 10:00 AM Marcial Rosenthal, [address]

## 2022-10-22 DIAGNOSIS — E87.6 HYPOKALEMIA: ICD-10-CM

## 2022-10-24 RX ORDER — POTASSIUM CHLORIDE 20 MEQ/1
TABLET, EXTENDED RELEASE ORAL
Qty: 30 TABLET | Refills: 0 | Status: SHIPPED | OUTPATIENT
Start: 2022-10-24 | End: 2022-11-22

## 2022-10-24 NOTE — TELEPHONE ENCOUNTER
Last office visit 8/12/22      Future Appointments   Date Time Provider [place]   2/13/2023 10:00 AM Jia Morris, [address]

## 2022-11-03 ENCOUNTER — NURSE ONLY (OUTPATIENT)
Dept: INTERNAL MEDICINE CLINIC | Age: 57
End: 2022-11-03
Payer: MEDICARE

## 2022-11-03 DIAGNOSIS — E79.0 HYPERURICEMIA: ICD-10-CM

## 2022-11-03 DIAGNOSIS — M10.9 GOUT, UNSPECIFIED CAUSE, UNSPECIFIED CHRONICITY, UNSPECIFIED SITE: ICD-10-CM

## 2022-11-03 DIAGNOSIS — E87.6 HYPOKALEMIA: ICD-10-CM

## 2022-11-03 LAB
ANION GAP SERPL CALCULATED.3IONS-SCNC: 14 MMOL/L (ref 3–16)
BUN BLDV-MCNC: 12 MG/DL (ref 7–20)
CALCIUM SERPL-MCNC: 9.7 MG/DL (ref 8.3–10.6)
CHLORIDE BLD-SCNC: 102 MMOL/L (ref 99–110)
CO2: 27 MMOL/L (ref 21–32)
CREAT SERPL-MCNC: 1.1 MG/DL (ref 0.9–1.3)
GFR SERPL CREATININE-BSD FRML MDRD: >60 ML/MIN/{1.73_M2}
GLUCOSE BLD-MCNC: 115 MG/DL (ref 70–99)
POTASSIUM SERPL-SCNC: 3.7 MMOL/L (ref 3.5–5.1)
SODIUM BLD-SCNC: 143 MMOL/L (ref 136–145)
URIC ACID, SERUM: 9 MG/DL (ref 3.5–7.2)

## 2022-11-03 PROCEDURE — 99999 PR OFFICE/OUTPT VISIT,PROCEDURE ONLY: CPT | Performed by: NURSE PRACTITIONER

## 2022-11-03 PROCEDURE — 36415 COLL VENOUS BLD VENIPUNCTURE: CPT | Performed by: NURSE PRACTITIONER

## 2022-11-03 RX ORDER — ALLOPURINOL 100 MG/1
200 TABLET ORAL 2 TIMES DAILY
Qty: 360 TABLET | Refills: 0 | Status: SHIPPED | OUTPATIENT
Start: 2022-11-03

## 2022-11-22 DIAGNOSIS — E87.6 HYPOKALEMIA: ICD-10-CM

## 2022-11-22 RX ORDER — POTASSIUM CHLORIDE 20 MEQ/1
TABLET, EXTENDED RELEASE ORAL
Qty: 90 TABLET | Refills: 1 | Status: SHIPPED | OUTPATIENT
Start: 2022-11-22

## 2022-11-22 NOTE — TELEPHONE ENCOUNTER
Future Appointments   Date Time Provider [place]   2/13/2023 10:00 AM Sugey Bright, [address]     Last appt on [phone]

## 2022-12-04 DIAGNOSIS — I10 PRIMARY HYPERTENSION: ICD-10-CM

## 2022-12-05 RX ORDER — HYDROCHLOROTHIAZIDE 25 MG/1
25 TABLET ORAL DAILY
Qty: 90 TABLET | Refills: 1 | Status: SHIPPED | OUTPATIENT
Start: 2022-12-05

## 2022-12-05 RX ORDER — AMLODIPINE BESYLATE 10 MG/1
10 TABLET ORAL DAILY
Qty: 90 TABLET | Refills: 1 | Status: SHIPPED | OUTPATIENT
Start: 2022-12-05

## 2022-12-06 DIAGNOSIS — I63.89 CEREBROVASCULAR ACCIDENT (CVA) DUE TO OTHER MECHANISM (HCC): ICD-10-CM

## 2022-12-06 DIAGNOSIS — I65.03 VERTEBRAL ARTERY STENOSIS, BILATERAL: ICD-10-CM

## 2022-12-06 DIAGNOSIS — E78.2 MIXED HYPERLIPIDEMIA: ICD-10-CM

## 2022-12-06 RX ORDER — ATORVASTATIN CALCIUM 40 MG/1
40 TABLET, FILM COATED ORAL DAILY
Qty: 90 TABLET | Refills: 1 | Status: SHIPPED | OUTPATIENT
Start: 2022-12-06

## 2022-12-06 NOTE — TELEPHONE ENCOUNTER
----- Message from Lj Gee sent at 12/6/2022  1:50 PM EST -----  Subject: Refill Request    QUESTIONS  Name of Medication? atorvastatin (LIPITOR) 40 MG tablet  Patient-reported dosage and instructions? 1 daily  How many days do you have left? 1  [facility]? [id number]  Pharmacy phone number (if available)? [phone]  ---------------------------------------------------------------------------  --------------  Shade RODARTE  What is the best way for the office to contact you? OK to leave message on   voicemail  Preferred Call Back Phone Number? [phone]  ---------------------------------------------------------------------------  --------------  SCRIPT ANSWERS  Relationship to Patient?  Self

## 2023-01-27 ENCOUNTER — TELEPHONE (OUTPATIENT)
Dept: INTERNAL MEDICINE CLINIC | Age: 58
End: 2023-01-27

## 2023-01-27 DIAGNOSIS — I65.03 VERTEBRAL ARTERY STENOSIS, BILATERAL: ICD-10-CM

## 2023-01-27 DIAGNOSIS — I63.89 CEREBROVASCULAR ACCIDENT (CVA) DUE TO OTHER MECHANISM (HCC): ICD-10-CM

## 2023-01-27 RX ORDER — CLOPIDOGREL BISULFATE 75 MG/1
75 TABLET ORAL DAILY
Qty: 90 TABLET | Refills: 0 | Status: SHIPPED | OUTPATIENT
Start: 2023-01-27

## 2023-01-27 NOTE — TELEPHONE ENCOUNTER
Patient needs refill of Clopidogrel sent to [facility] on [address]. [phone]    Please call patient is this is an issue.  [phone]

## 2023-01-27 NOTE — TELEPHONE ENCOUNTER
Future Appointments   Date Time Provider Malu Chilel   2/13/2023 10:00 AM Sugey Bright, [address]   Last appt on 8.12.2022

## 2023-02-04 DIAGNOSIS — M10.9 GOUT, UNSPECIFIED CAUSE, UNSPECIFIED CHRONICITY, UNSPECIFIED SITE: ICD-10-CM

## 2023-02-04 DIAGNOSIS — E79.0 HYPERURICEMIA: ICD-10-CM

## 2023-02-06 RX ORDER — ALLOPURINOL 200 MG/1
200 TABLET ORAL 2 TIMES DAILY
Qty: 30 TABLET | Refills: 1 | Status: SHIPPED | OUTPATIENT
Start: 2023-02-06 | End: 2023-02-23

## 2023-02-06 NOTE — TELEPHONE ENCOUNTER
Last office visit 8/12/22        Future Appointments   Date Time Provider Malu Chilel   2/13/2023 10:00 AM Pippa Fuller, [address]

## 2023-02-21 ENCOUNTER — OFFICE VISIT (OUTPATIENT)
Dept: INTERNAL MEDICINE CLINIC | Age: 58
End: 2023-02-21

## 2023-02-21 VITALS
SYSTOLIC BLOOD PRESSURE: 128 MMHG | BODY MASS INDEX: 34.62 KG/M2 | HEART RATE: 94 BPM | WEIGHT: 248.25 LBS | DIASTOLIC BLOOD PRESSURE: 88 MMHG | TEMPERATURE: 98.4 F | OXYGEN SATURATION: 97 %

## 2023-02-21 DIAGNOSIS — Z12.11 COLON CANCER SCREENING: ICD-10-CM

## 2023-02-21 DIAGNOSIS — I65.03 VERTEBRAL ARTERY STENOSIS, BILATERAL: ICD-10-CM

## 2023-02-21 DIAGNOSIS — E78.2 MIXED HYPERLIPIDEMIA: ICD-10-CM

## 2023-02-21 DIAGNOSIS — I63.89 CEREBROVASCULAR ACCIDENT (CVA) DUE TO OTHER MECHANISM (HCC): ICD-10-CM

## 2023-02-21 DIAGNOSIS — I10 PRIMARY HYPERTENSION: Primary | ICD-10-CM

## 2023-02-21 DIAGNOSIS — E79.0 HYPERURICEMIA: ICD-10-CM

## 2023-02-21 DIAGNOSIS — Z12.5 PROSTATE CANCER SCREENING: ICD-10-CM

## 2023-02-21 DIAGNOSIS — Z00.00 PREVENTATIVE HEALTH CARE: ICD-10-CM

## 2023-02-21 DIAGNOSIS — E04.1 THYROID NODULE: ICD-10-CM

## 2023-02-21 PROBLEM — I63.9 CEREBROVASCULAR ACCIDENT (HCC): Status: ACTIVE | Noted: 2023-02-21

## 2023-02-21 LAB
ANION GAP SERPL CALCULATED.3IONS-SCNC: 15 MMOL/L (ref 3–16)
BUN BLDV-MCNC: 15 MG/DL (ref 7–20)
CALCIUM SERPL-MCNC: 10 MG/DL (ref 8.3–10.6)
CHLORIDE BLD-SCNC: 99 MMOL/L (ref 99–110)
CO2: 26 MMOL/L (ref 21–32)
CREAT SERPL-MCNC: 1.1 MG/DL (ref 0.9–1.3)
GFR SERPL CREATININE-BSD FRML MDRD: >60 ML/MIN/{1.73_M2}
GLUCOSE BLD-MCNC: 101 MG/DL (ref 70–99)
POTASSIUM SERPL-SCNC: 3.4 MMOL/L (ref 3.5–5.1)
PROSTATE SPECIFIC ANTIGEN: 0.8 NG/ML (ref 0–4)
SODIUM BLD-SCNC: 140 MMOL/L (ref 136–145)
TSH REFLEX: 2.77 UIU/ML (ref 0.27–4.2)
URIC ACID, SERUM: 8.8 MG/DL (ref 3.5–7.2)

## 2023-02-21 RX ORDER — CLOPIDOGREL BISULFATE 75 MG/1
75 TABLET ORAL DAILY
Qty: 90 TABLET | Refills: 0 | Status: SHIPPED | OUTPATIENT
Start: 2023-02-21

## 2023-02-21 SDOH — ECONOMIC STABILITY: HOUSING INSECURITY
IN THE LAST 12 MONTHS, WAS THERE A TIME WHEN YOU DID NOT HAVE A STEADY PLACE TO SLEEP OR SLEPT IN A SHELTER (INCLUDING NOW)?: NO

## 2023-02-21 SDOH — ECONOMIC STABILITY: FOOD INSECURITY: WITHIN THE PAST 12 MONTHS, THE FOOD YOU BOUGHT JUST DIDN'T LAST AND YOU DIDN'T HAVE MONEY TO GET MORE.: NEVER TRUE

## 2023-02-21 SDOH — ECONOMIC STABILITY: FOOD INSECURITY: WITHIN THE PAST 12 MONTHS, YOU WORRIED THAT YOUR FOOD WOULD RUN OUT BEFORE YOU GOT MONEY TO BUY MORE.: NEVER TRUE

## 2023-02-21 SDOH — ECONOMIC STABILITY: INCOME INSECURITY: HOW HARD IS IT FOR YOU TO PAY FOR THE VERY BASICS LIKE FOOD, HOUSING, MEDICAL CARE, AND HEATING?: NOT VERY HARD

## 2023-02-21 ASSESSMENT — PATIENT HEALTH QUESTIONNAIRE - PHQ9
SUM OF ALL RESPONSES TO PHQ QUESTIONS 1-9: 0
2. FEELING DOWN, DEPRESSED OR HOPELESS: 0
SUM OF ALL RESPONSES TO PHQ9 QUESTIONS 1 & 2: 0
SUM OF ALL RESPONSES TO PHQ QUESTIONS 1-9: 0
SUM OF ALL RESPONSES TO PHQ QUESTIONS 1-9: 0
1. LITTLE INTEREST OR PLEASURE IN DOING THINGS: 0
SUM OF ALL RESPONSES TO PHQ QUESTIONS 1-9: 0

## 2023-02-21 ASSESSMENT — ENCOUNTER SYMPTOMS
TROUBLE SWALLOWING: 0
CONSTIPATION: 0
SHORTNESS OF BREATH: 0

## 2023-02-21 NOTE — PROGRESS NOTES
Date: 2/21/2023                                               Subjective/Objective:     Chief Complaint   Patient presents with    Hypertension    Hyperlipidemia       HPI    Patricia Matthews is a 64 yo male, visit today for follow up on chronic medical conditions. He denies concerns today. He is wondering if he would be able to come off some of his medications. CVA 4/2021 completed DAPT. 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 Has not been taking plavix due to issue with pharmacy. Did see neurology following hospital discharge. Hyperlipidemia is currently managed on atorvastatin. Hypertension, currently managed on amlodipine and HCTZ. Does monitor his blood pressure at home. Reports has been SBP 130s, doesn't have readings with him. Denies chest pain, SOB. He is currently taking allopurinol 200 mg twice daily, did not increase his allopurinol as previously directed Has not had follow-up uric acid level drawn. Denies medication side effects. He had incidental finding of left thyroid nodule with normal TSH, he had Thyroid US and CONDE uptake scan ordered on discharge that has not been completed. Age/Gender Health Maintenance     Colon Cancer Screening - colonoscopy at  over 10 years, normal ago per patient - has referral. Father had colon CA.   Never smoker     PSA screening - nl 11/2021, needs            Patient Active Problem List    Diagnosis Date Noted    Cerebrovascular accident ([facility] [address] 02/21/2023    Vitamin D deficiency 02/02/2022    Cerebrovascular accident (CVA) associated with severe acute respiratory syndrome coronavirus 2 (SARS-CoV-2) infection ([facility] [address] 04/23/2021    HTN (hypertension) 04/22/2021    Mixed hyperlipidemia 08/26/2015    Gout, arthritis 08/05/2015    Essential hypertension, benign 04/23/2015    Hyperuricemia 44/87/6561    Lichenification and lichen simplex chronicus 02/18/2014    Seborrheic dermatitis 02/18/2014    Gallstones 02/04/2014    Allergic rhinitis 07/24/2012       Past Medical History:   Diagnosis Date    Cerebrovascular disease     CVA (cerebral vascular accident) (HCC)     Gout     Hyperlipidemia     Hypertension        No past surgical history on file.    Nurse Only on 11/03/2022   Component Date Value Ref Range Status    Uric Acid, Serum 11/03/2022 9.0 (A)  3.5 - 7.2 mg/dL Final    Sodium 11/03/2022 143  136 - 145 mmol/L Final    Potassium 11/03/2022 3.7  3.5 - 5.1 mmol/L Final    Chloride 11/03/2022 102  99 - 110 mmol/L Final    CO2 11/03/2022 27  21 - 32 mmol/L Final    Anion Gap 11/03/2022 14  3 - 16 Final    Glucose 11/03/2022 115 (A)  70 - 99 mg/dL Final    BUN 11/03/2022 12  7 - 20 mg/dL Final    Creatinine 11/03/2022 1.1  0.9 - 1.3 mg/dL Final    Est, Glom Filt Rate 11/03/2022 >60  >60 Final    Comment: Pediatric calculator link  https://www.kidney.org/professionals/kdoqi/gfr_calculatorped  Effective Oct 3, 2022  These results are not intended for use in patients  <18 years of age.  eGFR results are calculated without  a race factor using the 2021 CKD-EPI equation.  Careful  clinical correlation is recommended, particularly when  comparing to results calculated using previous equations.  The CKD-EPI equation is less accurate in patients with  extremes of muscle mass, extra-renal metabolism of  creatinine, excessive creatinine ingestion, or following  therapy that affects renal tubular secretion.      Calcium 11/03/2022 9.7  8.3 - 10.6 mg/dL Final       Family History   Problem Relation Age of Onset    Cancer Father         colon cancer       Current Outpatient Medications   Medication Sig Dispense Refill    clopidogrel (PLAVIX) 75 MG tablet Take 1 tablet by mouth daily 90 tablet 0    allopurinol 200 MG TABS Take 200 mg by mouth 2 times daily 30 tablet 1    atorvastatin (LIPITOR) 40 MG tablet Take 1 tablet by mouth daily 90 tablet 1    hydroCHLOROthiazide (HYDRODIURIL) 25 MG tablet TAKE 1 TABLET BY MOUTH DAILY 90  tablet 1    amLODIPine (NORVASC) 10 MG tablet TAKE 1 TABLET BY MOUTH DAILY 90 tablet 1    potassium chloride (KLOR-CON M) 20 MEQ extended release tablet TAKE 1 TABLET BY MOUTH DAILY 90 tablet 1     No current facility-administered medications for this visit. No Known Allergies    Review of Systems   Constitutional:  Negative for chills and fever. HENT:  Negative for trouble swallowing. Respiratory:  Negative for shortness of breath. Cardiovascular:  Negative for chest pain. Gastrointestinal:  Negative for constipation. Neurological:  Negative for dizziness and headaches. Vitals:  /88   Pulse 94   Temp 98.4 °F (36.9 °C) (Oral)   Wt 248 lb 4 oz (112.6 kg)   SpO2 97%   BMI 34.62 kg/m²     Physical Exam  Vitals reviewed. Constitutional:       General: He is not in acute distress. HENT:      Head: Normocephalic and atraumatic. Cardiovascular:      Rate and Rhythm: Normal rate and regular rhythm. Heart sounds: Normal heart sounds. Pulmonary:      Effort: Pulmonary effort is normal.      Breath sounds: Normal breath sounds. Abdominal:      General: Bowel sounds are normal. There is no distension. Palpations: Abdomen is soft. Tenderness: There is no abdominal tenderness. Skin:     General: Skin is warm and dry. Neurological:      Mental Status: He is alert and oriented to person, place, and time. Psychiatric:         Behavior: Behavior normal.       Assessment/Plan     1. Primary hypertension: stable  - Basic Metabolic Panel; Future   -Continue amlodipine and hydrochlorothiazide    2. Cerebrovascular accident (CVA) due to other mechanism [facility]):  - clopidogrel (PLAVIX) 75 MG tablet; Take 1 tablet by mouth daily  Dispense: 90 tablet; Refill: 0   -Continue on statin. Continue with blood pressure control. Continue on clopidogrel. Requesting records from neurology. 3. Vertebral artery stenosis, bilateral  - clopidogrel (PLAVIX) 75 MG tablet;  Take 1 tablet by mouth daily  Dispense: 90 tablet; Refill: 0   -Continue on clopidogrel. Requesting records from neurology. 4. Mixed hyperlipidemia: Stable   -Continue atorvastatin    5. Hyperuricemia  - Uric Acid; Future  -Continue current dose of allopurinol, adjust plan accordingly pending uric acid    6. Thyroid nodule  - US THYROID; Future  - TSH with Reflex; Future  -Did not complete ultrasound of thyroid as previously ordered, encouraged to do so. Check TSH. 7. Prostate cancer screening  - PSA Screening; Future    8. Colon cancer screening  - MORALES West MD, Gastroenterology (ERCP & EUS), [facility]    9. Preventative health care  - External Referral To Dermatology    Orders Placed This Encounter   Procedures    US THYROID     This procedure can be scheduled via [facility] [facility]. Access your [facility] [facility] account by visiting [email or web address].      Standing Status:   Future     Standing Expiration Date:   2/21/2024     Order Specific Question:   Reason for exam:     Answer:   left thyroid nodule on CT    Uric Acid     Standing Status:   Future     Number of Occurrences:   1     Standing Expiration Date:   2/21/2024    PSA Screening     Standing Status:   Future     Number of Occurrences:   1     Standing Expiration Date:   8/60/3205    Basic Metabolic Panel     Standing Status:   Future     Number of Occurrences:   1     Standing Expiration Date:   2/21/2024    TSH with Reflex     Standing Status:   Future     Number of Occurrences:   1     Standing Expiration Date:   2/21/2024    MORALES West MD, Gastroenterology (ERCP & EUS), [facility]     Referral Priority:   Routine     Referral Type:   Eval and Treat     Referral Reason:   Specialty Services Required     Referred to Provider:   Shae Man MD     Requested Specialty:   Gastroenterology     Number of Visits Requested:   1    External Referral To Dermatology     Referral Priority:   Routine     Referral Type:   Eval and Treat Referral Reason:   Specialty Services Required     Requested Specialty:   Dermatology     Number of Visits Requested:   1       Return in about 6 months (around 8/21/2023) for hypertension, cholesterol. OR sooner with questions, concerns, worsening symptoms    ELIZABETH MELARA, RELL  2/21/2023  4:20 PM    Discussed use, benefit, and side effects of prescribed medications. Barriers to medication compliance addressed. Discussed all ordered testing and labs. All patient questions answered. Patient agreeable with plan above. Please note that this chart was generated using dragon dictation software. Although every effort was made to ensure the accuracy of this automated transcription, some errors in transcription may have occurred.

## 2023-02-23 DIAGNOSIS — E79.0 HYPERURICEMIA: ICD-10-CM

## 2023-02-23 DIAGNOSIS — E87.6 HYPOKALEMIA: ICD-10-CM

## 2023-02-23 DIAGNOSIS — M10.9 GOUT, UNSPECIFIED CAUSE, UNSPECIFIED CHRONICITY, UNSPECIFIED SITE: ICD-10-CM

## 2023-02-23 RX ORDER — ALLOPURINOL 300 MG/1
300 TABLET ORAL 2 TIMES DAILY
Qty: 60 TABLET | Refills: 1 | Status: SHIPPED | OUTPATIENT
Start: 2023-02-23

## 2023-02-23 RX ORDER — POTASSIUM CHLORIDE 20 MEQ/1
20 TABLET, EXTENDED RELEASE ORAL 2 TIMES DAILY
Qty: 180 TABLET | Refills: 1 | Status: SHIPPED | OUTPATIENT
Start: 2023-02-23

## 2023-04-06 ENCOUNTER — OFFICE VISIT (OUTPATIENT)
Dept: INTERNAL MEDICINE CLINIC | Age: 58
End: 2023-04-06
Payer: MEDICAID

## 2023-04-06 VITALS
BODY MASS INDEX: 34.72 KG/M2 | RESPIRATION RATE: 16 BRPM | HEIGHT: 71 IN | WEIGHT: 248 LBS | DIASTOLIC BLOOD PRESSURE: 80 MMHG | SYSTOLIC BLOOD PRESSURE: 134 MMHG | TEMPERATURE: 96.8 F | HEART RATE: 86 BPM | OXYGEN SATURATION: 97 %

## 2023-04-06 DIAGNOSIS — E79.0 HYPERURICEMIA: ICD-10-CM

## 2023-04-06 DIAGNOSIS — M10.9 GOUT, UNSPECIFIED CAUSE, UNSPECIFIED CHRONICITY, UNSPECIFIED SITE: ICD-10-CM

## 2023-04-06 DIAGNOSIS — L30.9 DERMATITIS: ICD-10-CM

## 2023-04-06 DIAGNOSIS — E87.6 HYPOKALEMIA: ICD-10-CM

## 2023-04-06 DIAGNOSIS — E79.0 HYPERURICEMIA: Primary | ICD-10-CM

## 2023-04-06 LAB
POTASSIUM SERPL-SCNC: 3.3 MMOL/L (ref 3.5–5.1)
URATE SERPL-MCNC: 7.4 MG/DL (ref 3.5–7.2)

## 2023-04-06 PROCEDURE — 3075F SYST BP GE 130 - 139MM HG: CPT | Performed by: NURSE PRACTITIONER

## 2023-04-06 PROCEDURE — 99213 OFFICE O/P EST LOW 20 MIN: CPT | Performed by: NURSE PRACTITIONER

## 2023-04-06 PROCEDURE — 3079F DIAST BP 80-89 MM HG: CPT | Performed by: NURSE PRACTITIONER

## 2023-04-06 PROCEDURE — 36415 COLL VENOUS BLD VENIPUNCTURE: CPT | Performed by: NURSE PRACTITIONER

## 2023-04-06 RX ORDER — TRIAMCINOLONE ACETONIDE 0.25 MG/G
OINTMENT TOPICAL
Qty: 80 G | Refills: 0 | Status: SHIPPED | OUTPATIENT
Start: 2023-04-06 | End: 2023-04-13

## 2023-04-06 RX ORDER — ALLOPURINOL 100 MG/1
100 TABLET ORAL NIGHTLY
Qty: 90 TABLET | Refills: 0 | Status: SHIPPED | OUTPATIENT
Start: 2023-04-06

## 2023-04-06 RX ORDER — ALLOPURINOL 300 MG/1
300 TABLET ORAL DAILY
Qty: 90 TABLET | Refills: 0 | Status: SHIPPED | OUTPATIENT
Start: 2023-04-06

## 2023-04-06 ASSESSMENT — ENCOUNTER SYMPTOMS: SHORTNESS OF BREATH: 0

## 2023-04-06 NOTE — PROGRESS NOTES
Gallstones 02/04/2014    Allergic rhinitis 07/24/2012       Past Medical History:   Diagnosis Date    Cerebrovascular disease     CVA (cerebral vascular accident) ([facility] [address]     Gout     Hyperlipidemia     Hypertension        No past surgical history on file. Office Visit on 02/21/2023   Component Date Value Ref Range Status    Uric Acid, Serum 02/21/2023 8.8 (H)  3.5 - 7.2 mg/dL Final    PSA 02/21/2023 0.80  0.00 - 4.00 ng/mL Final    Sodium 02/21/2023 140  136 - 145 mmol/L Final    Potassium 02/21/2023 3.4 (L)  3.5 - 5.1 mmol/L Final    Chloride 02/21/2023 99  99 - 110 mmol/L Final    CO2 02/21/2023 26  21 - 32 mmol/L Final    Anion Gap 02/21/2023 15  3 - 16 Final    Glucose 02/21/2023 101 (H)  70 - 99 mg/dL Final    BUN 02/21/2023 15  7 - 20 mg/dL Final    Creatinine 02/21/2023 1.1  0.9 - 1.3 mg/dL Final    Est, Glom Filt Rate 02/21/2023 >60  >60 Final    Comment: Pediatric calculator link  [facility]/professionals/kdoqi/gfr_calculatorped  Effective Oct 3, 2022  These results are not intended for use in patients  <25years of age. eGFR results are calculated without  a race factor using the 2021 CKD-EPI equation. Careful  clinical correlation is recommended, particularly when  comparing to results calculated using previous equations. The CKD-EPI equation is less accurate in patients with  extremes of muscle mass, extra-renal metabolism of  creatinine, excessive creatinine ingestion, or following  therapy that affects renal tubular secretion. Calcium 02/21/2023 10.0  8.3 - 10.6 mg/dL Final    TSH 02/21/2023 2.77  0.27 - 4.20 uIU/mL Final       Family History   Problem Relation Age of Onset    Cancer Father         colon cancer       Current Outpatient Medications   Medication Sig Dispense Refill    triamcinolone (KENALOG) 0.025 % ointment Apply topically 2 times daily.  80 g 0    allopurinol (ZYLOPRIM) 300 MG tablet Take 1 tablet by mouth 2 times daily 60 tablet 1    potassium chloride

## 2023-04-06 NOTE — PATIENT INSTRUCTIONS
Thyroid ultrasound  See dermatologist  Colonoscopy   Try triamcinolone on neck, let me know how this is doing.

## 2023-05-17 DIAGNOSIS — I65.03 VERTEBRAL ARTERY STENOSIS, BILATERAL: ICD-10-CM

## 2023-05-17 DIAGNOSIS — I10 PRIMARY HYPERTENSION: ICD-10-CM

## 2023-05-17 DIAGNOSIS — E78.2 MIXED HYPERLIPIDEMIA: ICD-10-CM

## 2023-05-17 DIAGNOSIS — I63.89 CEREBROVASCULAR ACCIDENT (CVA) DUE TO OTHER MECHANISM (HCC): ICD-10-CM

## 2023-05-17 RX ORDER — AMLODIPINE BESYLATE 10 MG/1
10 TABLET ORAL DAILY
Qty: 90 TABLET | Refills: 1 | Status: SHIPPED | OUTPATIENT
Start: 2023-05-17 | End: 2023-07-20 | Stop reason: SDUPTHER

## 2023-05-17 RX ORDER — ATORVASTATIN CALCIUM 40 MG/1
40 TABLET, FILM COATED ORAL DAILY
Qty: 90 TABLET | Refills: 1 | Status: SHIPPED | OUTPATIENT
Start: 2023-05-17

## 2023-05-17 RX ORDER — HYDROCHLOROTHIAZIDE 25 MG/1
25 TABLET ORAL DAILY
Qty: 90 TABLET | Refills: 1 | Status: SHIPPED | OUTPATIENT
Start: 2023-05-17

## 2023-05-18 DIAGNOSIS — I63.89 CEREBROVASCULAR ACCIDENT (CVA) DUE TO OTHER MECHANISM (HCC): ICD-10-CM

## 2023-05-18 DIAGNOSIS — I65.03 VERTEBRAL ARTERY STENOSIS, BILATERAL: ICD-10-CM

## 2023-05-18 RX ORDER — CLOPIDOGREL BISULFATE 75 MG/1
75 TABLET ORAL DAILY
Qty: 90 TABLET | Refills: 1 | Status: SHIPPED | OUTPATIENT
Start: 2023-05-18 | End: 2023-08-16

## 2023-07-05 DIAGNOSIS — E79.0 HYPERURICEMIA: ICD-10-CM

## 2023-07-05 DIAGNOSIS — M10.9 GOUT, UNSPECIFIED CAUSE, UNSPECIFIED CHRONICITY, UNSPECIFIED SITE: ICD-10-CM

## 2023-07-06 RX ORDER — ALLOPURINOL 300 MG/1
300 TABLET ORAL DAILY
Qty: 30 TABLET | Refills: 0 | Status: SHIPPED | OUTPATIENT
Start: 2023-07-06 | End: 2023-08-01

## 2023-07-19 DIAGNOSIS — I10 PRIMARY HYPERTENSION: ICD-10-CM

## 2023-07-19 NOTE — TELEPHONE ENCOUNTER
Pt called for an RX for Amlodipine Besylate 10 mg, #90, 1 po qd.     [address]

## 2023-07-20 RX ORDER — AMLODIPINE BESYLATE 10 MG/1
10 TABLET ORAL DAILY
Qty: 90 TABLET | Refills: 1 | Status: SHIPPED | OUTPATIENT
Start: 2023-07-20

## 2023-07-24 ENCOUNTER — NURSE ONLY (OUTPATIENT)
Dept: INTERNAL MEDICINE CLINIC | Age: 58
End: 2023-07-24
Payer: MEDICAID

## 2023-07-24 DIAGNOSIS — E79.0 HYPERURICEMIA: ICD-10-CM

## 2023-07-24 DIAGNOSIS — M10.9 GOUT, UNSPECIFIED CAUSE, UNSPECIFIED CHRONICITY, UNSPECIFIED SITE: ICD-10-CM

## 2023-07-24 LAB — URATE SERPL-MCNC: 8.3 MG/DL (ref 3.5–7.2)

## 2023-07-24 PROCEDURE — 99999 PR OFFICE/OUTPT VISIT,PROCEDURE ONLY: CPT | Performed by: NURSE PRACTITIONER

## 2023-07-24 PROCEDURE — 36415 COLL VENOUS BLD VENIPUNCTURE: CPT | Performed by: NURSE PRACTITIONER

## 2023-07-28 ENCOUNTER — TELEPHONE (OUTPATIENT)
Dept: INTERNAL MEDICINE CLINIC | Age: 58
End: 2023-07-28

## 2023-07-28 NOTE — TELEPHONE ENCOUNTER
Patient called for his Uric Acid result, he wasn't taken his Allopurinol correctly, he stated he was taking 100 MG in AM and PM. He was informed to take Allopurinol 300 MG AM, and 100 MG PM pre [facility] notes.

## 2023-08-01 DIAGNOSIS — E79.0 HYPERURICEMIA: ICD-10-CM

## 2023-08-01 DIAGNOSIS — M10.9 GOUT, UNSPECIFIED CAUSE, UNSPECIFIED CHRONICITY, UNSPECIFIED SITE: ICD-10-CM

## 2023-08-01 RX ORDER — ALLOPURINOL 100 MG/1
TABLET ORAL
Qty: 90 TABLET | Refills: 1 | Status: SHIPPED | OUTPATIENT
Start: 2023-08-01

## 2023-10-12 DIAGNOSIS — E79.0 HYPERURICEMIA: ICD-10-CM

## 2023-10-12 DIAGNOSIS — M10.9 GOUT, UNSPECIFIED CAUSE, UNSPECIFIED CHRONICITY, UNSPECIFIED SITE: ICD-10-CM

## 2023-10-12 RX ORDER — ALLOPURINOL 100 MG/1
TABLET ORAL
Qty: 90 TABLET | Refills: 1 | Status: SHIPPED | OUTPATIENT
Start: 2023-10-12

## 2023-10-12 NOTE — TELEPHONE ENCOUNTER
Medication:   Requested Prescriptions     Pending Prescriptions Disp Refills    allopurinol (ZYLOPRIM) 100 MG tablet [Pharmacy Med Name: ALLOPURINOL 100MG TABLETS] 90 tablet 1     Sig: TAKE 2 TABLETS BY MOUTH EVERY MORNING AND 1 TABLET BY MOUTH NIGHTLY        Last Filled:  8/1/2023    Patient Phone Number: [phone] (home)     Last appt: 4/6/2023   Next appt: Visit date not found    Lab Results   Component Value Date    LABURIC 8.3 (H) 07/24/2023

## 2023-10-27 DIAGNOSIS — I63.89 CEREBROVASCULAR ACCIDENT (CVA) DUE TO OTHER MECHANISM (HCC): ICD-10-CM

## 2023-10-27 DIAGNOSIS — I65.03 VERTEBRAL ARTERY STENOSIS, BILATERAL: ICD-10-CM

## 2023-10-27 DIAGNOSIS — E78.2 MIXED HYPERLIPIDEMIA: ICD-10-CM

## 2023-10-27 RX ORDER — ATORVASTATIN CALCIUM 40 MG/1
40 TABLET, FILM COATED ORAL DAILY
Qty: 90 TABLET | Refills: 0 | Status: SHIPPED | OUTPATIENT
Start: 2023-10-27

## 2024-01-25 DIAGNOSIS — E78.2 MIXED HYPERLIPIDEMIA: ICD-10-CM

## 2024-01-25 DIAGNOSIS — I63.89 CEREBROVASCULAR ACCIDENT (CVA) DUE TO OTHER MECHANISM (HCC): ICD-10-CM

## 2024-01-25 DIAGNOSIS — I65.03 VERTEBRAL ARTERY STENOSIS, BILATERAL: ICD-10-CM

## 2024-01-25 DIAGNOSIS — I10 PRIMARY HYPERTENSION: ICD-10-CM

## 2024-01-25 RX ORDER — ATORVASTATIN CALCIUM 40 MG/1
40 TABLET, FILM COATED ORAL DAILY
Qty: 30 TABLET | Refills: 0 | Status: SHIPPED | OUTPATIENT
Start: 2024-01-25

## 2024-01-25 RX ORDER — HYDROCHLOROTHIAZIDE 25 MG/1
25 TABLET ORAL DAILY
Qty: 30 TABLET | Refills: 0 | Status: SHIPPED | OUTPATIENT
Start: 2024-01-25

## 2024-01-25 RX ORDER — AMLODIPINE BESYLATE 10 MG/1
10 TABLET ORAL DAILY
Qty: 30 TABLET | Refills: 0 | Status: SHIPPED | OUTPATIENT
Start: 2024-01-25

## 2024-02-02 NOTE — PROGRESS NOTES
Date: 2/5/2024                                               Subjective/Objective:     Chief Complaint   Patient presents with    Hypertension    Hyperlipidemia       HPI    Rashad Diaz is a 59 yo male, visit today for follow up on chronic medical conditions. He denies concerns today. He stopped taking atorvastatin, clopidogrel, HCTZ about 8 months ago was feeling \"foggy, lethargic, fatigue.\" He stopped these within 3 weeks each other.     CVA 4/2021 completed DAPT. 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 He self discontinued the plavix.     Hyperlipidemia - he stopped atorvastatin due to side effects.  Feels like he tolerated the 10 mg well in the past.      Hypertension - on amlodipine. He stopped HCTZ. Does monitor his blood pressure at home. Denies chest pain, SOB.      Allopurinol increased to 200 mg AM and 100 mg previously, did not complete follow up uric acid.     He had incidental finding of left thyroid nodule with normal TSH, he had Thyroid US and CONDE uptake scan ordered on discharge that has not been completed.    Continues to have bumps on his lower scalp/neck area. They drain at times. Triamcinolone, OTC hydrocortisone have not offered relief. Was previously referred to dermatology, did not schedule as he was frustrated by 8 month wait to be seen.      Age/Gender Health Maintenance     Colon Cancer Screening - colonoscopy at  over 10 years, normal ago per patient - has referral. Father had colon CA.  Never smoker     PSA screening - nl 2/2023         Patient Active Problem List    Diagnosis Date Noted    Cerebrovascular accident (HCC) 02/21/2023    Vitamin D deficiency 02/02/2022    Cerebrovascular accident (CVA) associated with severe acute respiratory syndrome coronavirus 2 (SARS-CoV-2) infection (HCC) 04/23/2021    HTN (hypertension) 04/22/2021    Mixed hyperlipidemia 08/26/2015    Gout, arthritis 08/05/2015    Essential hypertension, benign 04/23/2015

## 2024-02-05 ENCOUNTER — OFFICE VISIT (OUTPATIENT)
Dept: INTERNAL MEDICINE CLINIC | Age: 59
End: 2024-02-05
Payer: MEDICAID

## 2024-02-05 VITALS
DIASTOLIC BLOOD PRESSURE: 84 MMHG | TEMPERATURE: 98 F | OXYGEN SATURATION: 96 % | BODY MASS INDEX: 35.2 KG/M2 | WEIGHT: 252.38 LBS | HEART RATE: 86 BPM | SYSTOLIC BLOOD PRESSURE: 132 MMHG

## 2024-02-05 DIAGNOSIS — E79.0 HYPERURICEMIA: ICD-10-CM

## 2024-02-05 DIAGNOSIS — I10 PRIMARY HYPERTENSION: Primary | ICD-10-CM

## 2024-02-05 DIAGNOSIS — I63.89 CEREBROVASCULAR ACCIDENT (CVA) DUE TO OTHER MECHANISM (HCC): ICD-10-CM

## 2024-02-05 DIAGNOSIS — Z12.11 COLON CANCER SCREENING: ICD-10-CM

## 2024-02-05 DIAGNOSIS — E04.1 THYROID NODULE: ICD-10-CM

## 2024-02-05 DIAGNOSIS — Z12.5 PROSTATE CANCER SCREENING: ICD-10-CM

## 2024-02-05 DIAGNOSIS — Z00.00 PREVENTATIVE HEALTH CARE: ICD-10-CM

## 2024-02-05 DIAGNOSIS — R21 SKIN ERUPTION: ICD-10-CM

## 2024-02-05 DIAGNOSIS — E78.2 MIXED HYPERLIPIDEMIA: ICD-10-CM

## 2024-02-05 DIAGNOSIS — I65.03 VERTEBRAL ARTERY STENOSIS, BILATERAL: ICD-10-CM

## 2024-02-05 LAB
ALBUMIN SERPL-MCNC: 4.7 G/DL (ref 3.4–5)
ALP SERPL-CCNC: 84 U/L (ref 40–129)
ALT SERPL-CCNC: 25 U/L (ref 10–40)
ANION GAP SERPL CALCULATED.3IONS-SCNC: 14 MMOL/L (ref 3–16)
AST SERPL-CCNC: 17 U/L (ref 15–37)
BILIRUB DIRECT SERPL-MCNC: <0.2 MG/DL (ref 0–0.3)
BILIRUB INDIRECT SERPL-MCNC: NORMAL MG/DL (ref 0–1)
BILIRUB SERPL-MCNC: 0.7 MG/DL (ref 0–1)
BUN SERPL-MCNC: 9 MG/DL (ref 7–20)
CALCIUM SERPL-MCNC: 9.5 MG/DL (ref 8.3–10.6)
CHLORIDE SERPL-SCNC: 104 MMOL/L (ref 99–110)
CHOLEST SERPL-MCNC: 205 MG/DL (ref 0–199)
CO2 SERPL-SCNC: 25 MMOL/L (ref 21–32)
CREAT SERPL-MCNC: 1 MG/DL (ref 0.9–1.3)
GFR SERPLBLD CREATININE-BSD FMLA CKD-EPI: >60 ML/MIN/{1.73_M2}
GLUCOSE SERPL-MCNC: 98 MG/DL (ref 70–99)
HDLC SERPL-MCNC: 40 MG/DL (ref 40–60)
LDL CHOLESTEROL CALCULATED: 136 MG/DL
POTASSIUM SERPL-SCNC: 3.9 MMOL/L (ref 3.5–5.1)
PROT SERPL-MCNC: 7.4 G/DL (ref 6.4–8.2)
PSA SERPL DL<=0.01 NG/ML-MCNC: 0.89 NG/ML (ref 0–4)
SODIUM SERPL-SCNC: 143 MMOL/L (ref 136–145)
TRIGL SERPL-MCNC: 146 MG/DL (ref 0–150)
TSH SERPL DL<=0.005 MIU/L-ACNC: 2.75 UIU/ML (ref 0.27–4.2)
URATE SERPL-MCNC: 6.5 MG/DL (ref 3.5–7.2)
VLDLC SERPL CALC-MCNC: 29 MG/DL

## 2024-02-05 PROCEDURE — 3075F SYST BP GE 130 - 139MM HG: CPT | Performed by: NURSE PRACTITIONER

## 2024-02-05 PROCEDURE — 36415 COLL VENOUS BLD VENIPUNCTURE: CPT | Performed by: NURSE PRACTITIONER

## 2024-02-05 PROCEDURE — 99214 OFFICE O/P EST MOD 30 MIN: CPT | Performed by: NURSE PRACTITIONER

## 2024-02-05 PROCEDURE — 3079F DIAST BP 80-89 MM HG: CPT | Performed by: NURSE PRACTITIONER

## 2024-02-05 RX ORDER — ATORVASTATIN CALCIUM 10 MG/1
10 TABLET, FILM COATED ORAL DAILY
Qty: 90 TABLET | Refills: 1 | Status: SHIPPED | OUTPATIENT
Start: 2024-02-05

## 2024-02-05 RX ORDER — CLOPIDOGREL BISULFATE 75 MG/1
75 TABLET ORAL DAILY
Qty: 90 TABLET | Refills: 0 | Status: SHIPPED | OUTPATIENT
Start: 2024-02-05 | End: 2024-05-05

## 2024-02-05 ASSESSMENT — PATIENT HEALTH QUESTIONNAIRE - PHQ9
SUM OF ALL RESPONSES TO PHQ QUESTIONS 1-9: 0
1. LITTLE INTEREST OR PLEASURE IN DOING THINGS: 0
SUM OF ALL RESPONSES TO PHQ QUESTIONS 1-9: 0
SUM OF ALL RESPONSES TO PHQ QUESTIONS 1-9: 0
2. FEELING DOWN, DEPRESSED OR HOPELESS: 0
SUM OF ALL RESPONSES TO PHQ9 QUESTIONS 1 & 2: 0
SUM OF ALL RESPONSES TO PHQ QUESTIONS 1-9: 0

## 2024-02-05 ASSESSMENT — ENCOUNTER SYMPTOMS
CONSTIPATION: 0
SHORTNESS OF BREATH: 0

## 2024-02-05 NOTE — PATIENT INSTRUCTIONS
Ultrasound of thyroid [phone]  See dermatologist   See neurologist  Colonoscopy   Try mupirocin ointment on scalp  Resume atorvastatin and plavix, let me know if any issues

## 2024-02-24 DIAGNOSIS — E78.2 MIXED HYPERLIPIDEMIA: ICD-10-CM

## 2024-02-24 DIAGNOSIS — I65.03 VERTEBRAL ARTERY STENOSIS, BILATERAL: ICD-10-CM

## 2024-02-24 DIAGNOSIS — I10 PRIMARY HYPERTENSION: ICD-10-CM

## 2024-02-24 DIAGNOSIS — I63.89 CEREBROVASCULAR ACCIDENT (CVA) DUE TO OTHER MECHANISM (HCC): ICD-10-CM

## 2024-02-26 RX ORDER — ATORVASTATIN CALCIUM 40 MG/1
40 TABLET, FILM COATED ORAL DAILY
Qty: 30 TABLET | Refills: 0 | OUTPATIENT
Start: 2024-02-26

## 2024-02-26 RX ORDER — AMLODIPINE BESYLATE 10 MG/1
10 TABLET ORAL DAILY
Qty: 90 TABLET | Refills: 1 | Status: SHIPPED | OUTPATIENT
Start: 2024-02-26

## 2024-02-26 RX ORDER — HYDROCHLOROTHIAZIDE 25 MG/1
25 TABLET ORAL DAILY
Qty: 30 TABLET | Refills: 0 | OUTPATIENT
Start: 2024-02-26

## 2024-05-10 DIAGNOSIS — I63.89 CEREBROVASCULAR ACCIDENT (CVA) DUE TO OTHER MECHANISM (HCC): ICD-10-CM

## 2024-05-10 DIAGNOSIS — E78.2 MIXED HYPERLIPIDEMIA: ICD-10-CM

## 2024-05-10 RX ORDER — ATORVASTATIN CALCIUM 10 MG/1
10 TABLET, FILM COATED ORAL DAILY
Qty: 90 TABLET | Refills: 1 | Status: SHIPPED | OUTPATIENT
Start: 2024-05-10

## 2024-07-29 ENCOUNTER — COMMUNITY OUTREACH (OUTPATIENT)
Dept: INTERNAL MEDICINE CLINIC | Age: 59
End: 2024-07-29

## 2025-04-07 ENCOUNTER — TELEPHONE (OUTPATIENT)
Dept: SURGERY | Age: 60
End: 2025-04-07

## 2025-04-07 ENCOUNTER — PROCEDURE VISIT (OUTPATIENT)
Dept: SURGERY | Age: 60
End: 2025-04-07
Payer: MEDICAID

## 2025-04-07 VITALS — DIASTOLIC BLOOD PRESSURE: 94 MMHG | HEART RATE: 100 BPM | SYSTOLIC BLOOD PRESSURE: 152 MMHG

## 2025-04-07 DIAGNOSIS — C44.319 BASAL CELL CARCINOMA OF FOREHEAD: Primary | ICD-10-CM

## 2025-04-07 PROCEDURE — 12052 INTMD RPR FACE/MM 2.6-5.0 CM: CPT | Performed by: DERMATOLOGY

## 2025-04-07 PROCEDURE — 17311 MOHS 1 STAGE H/N/HF/G: CPT | Performed by: DERMATOLOGY

## 2025-04-07 NOTE — TELEPHONE ENCOUNTER
Called patient but no way to leave [facility].  He would like help with a growth on his scalp. Said his dermatologist wouldn't taken it off.    Dr. Saldana suggested his dermatologist make a referral to her or another surgeon to have the site examined.    If patient calls please tell him this, as anyone on clinical team can pass this message along.    No further clinical action required at this time.

## 2025-04-07 NOTE — PROGRESS NOTES
PRE-PROCEDURE SCREENING    Pacemaker/ICD: No  Difficulty with numbing in the past: No  Local Anesthesia Reaction/passing out: No  Latex or adhesive allergy:  No  Any history of reaction to suture or skin glue:  No  Bleeding/Clotting Disorders: No  Anticoagulant Therapy: No  Joint prosthesis: No  Artificial Heart Valve: No  Stroke or Seizures: Yes, hx of stroke  Organ Transplant or Lymphoma: No  Immunosuppression: No  Respiratory Problems: No

## 2025-04-07 NOTE — PATIENT INSTRUCTIONS
massage the area with a moisturizer, petroleum jelly (Vaseline) or Aquaphor. This helps to soften the scar tissue. You can begin this 1 month after the day of your surgery.  -A Silicone scar gel product may also be beneficial in regards to scar appearance. This can be used but is not usually necessary.  -The color of the scar will even out over time, but may remain pink for several months.     Call us at [phone] right away if you have any of the following symptoms:  -Bleeding that you cannot stop (see highlighted area above)   -Pain that lasts longer than 48 hours  -Your wound becomes more painful, red or hot to touch  -Bruising and swelling that does not begin to improve within the 48 hours or gets worse suddenly.

## 2025-04-07 NOTE — PROGRESS NOTES
MOHS PROCEDURE NOTE    PHYSICIAN:  Suzanna Saldana MD, Who operated in two distinct and integrated capacities as the surgeon removing the tissue and as the pathologist examining the tissue.    ASSISTANT: Reginaldo Roper RN; Francisco Perry RN     REFERRING PROVIDER:  Jaqueline Jeffrey PA-C    PREOPERATIVE DIAGNOSIS: Nodular Basal Cell Carcinoma     SPECIFIC MOHS INDICATIONS:  location and need for tissue conservation    AUC SCORIN/9    POSTOPERATIVE DIAGNOSIS: SAME    LOCATION: Right Forehead    OPERATIVE PROCEDURE:  MOHS MICROGRAPHIC SURGERY    RECONSTRUCTION OF DEFECT: Intermediate layered closure    PREOPERATIVE SIZE: 9 x 9 MM    DEFECT SIZE: 12 x 11 MM    LENGTH OF REPAIRED WOUND/SIZE OF FLAP/SIZE OF GRAFT:  42 MM    ANESTHESIA:  8 mL 1% lidocaine with epinephrine 1:100,000 buffered.     EBL:  MINIMAL    DURATION OF PROCEDURE:  45 MIN    POSTOPERATIVE OBSERVATION: 45 MIN    SPECIMENS:  SEE MOHS MAP    COMPLICATIONS:  NONE    DESCRIPTION OF PROCEDURE:  The patient was given a mirror, as appropriate, and the biopsy site was identified, marked with a surgical marking pen, and verified by the patient.   Options for treatment were discussed and the patient was informed that Mohs surgery was the selected treatment based on its lower recurrence rate, given the features listed above, as compared to other treatment modalities such as excision, radiation, or curettage, and agreed with this treatment plan.  Risks and benefits including bruising, swelling, bleeding, infection, nerve injury, recurrence, and scarring were discussed with the patient prior to the procedure and a written consent detailing these and other risks was reviewed with the patient and signed.    There was a time out for person and procedure verification.  The surgical site was prepped with an antiseptic solution.  Application of an antiseptic solution was repeated before each surgical stage.      Stage I:  The clinically-apparent tumor was carefully

## 2025-04-08 ENCOUNTER — TELEPHONE (OUTPATIENT)
Dept: SURGERY | Age: 60
End: 2025-04-08

## 2025-04-08 NOTE — TELEPHONE ENCOUNTER
The patient was in the office on 04/07/2025 for Mohs located on the RT forehead with ILC repair.  The patient tolerated the procedure well and left the office in good condition.    Pain level on post-operative day 1:  none and no medication has been needed.  He advises doing very well.     Any bleeding episode that required pressure to be held, bandage change or a call to the office or MD?  no     Any other issues?:  no    A post-operative telephone call was placed at 11:53a, 04/08/2025, in order to check on the patient's recovery process.  The patient reported doing well and had no complaints other than those listed above, if any.  All of the patient's questions were answered. Advised to call w/any questions/concerns.

## 2025-04-28 ENCOUNTER — OFFICE VISIT (OUTPATIENT)
Dept: INTERNAL MEDICINE CLINIC | Age: 60
End: 2025-04-28
Payer: MEDICAID

## 2025-04-28 ENCOUNTER — RESULTS FOLLOW-UP (OUTPATIENT)
Dept: INTERNAL MEDICINE CLINIC | Age: 60
End: 2025-04-28

## 2025-04-28 VITALS
DIASTOLIC BLOOD PRESSURE: 110 MMHG | OXYGEN SATURATION: 96 % | HEART RATE: 72 BPM | WEIGHT: 249 LBS | HEIGHT: 71 IN | SYSTOLIC BLOOD PRESSURE: 158 MMHG | BODY MASS INDEX: 34.86 KG/M2

## 2025-04-28 DIAGNOSIS — E04.1 LEFT THYROID NODULE: ICD-10-CM

## 2025-04-28 DIAGNOSIS — I10 HYPERTENSION, UNSPECIFIED TYPE: Primary | ICD-10-CM

## 2025-04-28 DIAGNOSIS — R10.11 RUQ ABDOMINAL PAIN: ICD-10-CM

## 2025-04-28 DIAGNOSIS — R79.89 ELEVATED LFTS: ICD-10-CM

## 2025-04-28 DIAGNOSIS — I65.03 VERTEBRAL ARTERY STENOSIS, BILATERAL: ICD-10-CM

## 2025-04-28 DIAGNOSIS — R10.11 RUQ ABDOMINAL PAIN: Primary | ICD-10-CM

## 2025-04-28 DIAGNOSIS — E78.2 MIXED HYPERLIPIDEMIA: ICD-10-CM

## 2025-04-28 DIAGNOSIS — Z12.5 PROSTATE CANCER SCREENING: ICD-10-CM

## 2025-04-28 DIAGNOSIS — I63.89 CEREBROVASCULAR ACCIDENT (CVA) DUE TO OTHER MECHANISM (HCC): ICD-10-CM

## 2025-04-28 DIAGNOSIS — E79.0 HYPERURICEMIA: ICD-10-CM

## 2025-04-28 DIAGNOSIS — E04.1 THYROID NODULE: ICD-10-CM

## 2025-04-28 LAB
ALBUMIN SERPL-MCNC: 4.4 G/DL (ref 3.4–5)
ALP SERPL-CCNC: 150 U/L (ref 40–129)
ALT SERPL-CCNC: 271 U/L (ref 10–40)
ANION GAP SERPL CALCULATED.3IONS-SCNC: 13 MMOL/L (ref 3–16)
AST SERPL-CCNC: 160 U/L (ref 15–37)
BILIRUB DIRECT SERPL-MCNC: 0.8 MG/DL (ref 0–0.3)
BILIRUB INDIRECT SERPL-MCNC: 1.3 MG/DL (ref 0–1)
BILIRUB SERPL-MCNC: 2.1 MG/DL (ref 0–1)
BUN SERPL-MCNC: 12 MG/DL (ref 7–20)
CALCIUM SERPL-MCNC: 9.4 MG/DL (ref 8.3–10.6)
CHLORIDE SERPL-SCNC: 102 MMOL/L (ref 99–110)
CHOLEST SERPL-MCNC: 205 MG/DL (ref 0–199)
CO2 SERPL-SCNC: 25 MMOL/L (ref 21–32)
CREAT SERPL-MCNC: 1.2 MG/DL (ref 0.9–1.3)
GFR SERPLBLD CREATININE-BSD FMLA CKD-EPI: 70 ML/MIN/{1.73_M2}
GLUCOSE SERPL-MCNC: 98 MG/DL (ref 70–99)
HDLC SERPL-MCNC: 42 MG/DL (ref 40–60)
LDL CHOLESTEROL: 141 MG/DL
POTASSIUM SERPL-SCNC: 4 MMOL/L (ref 3.5–5.1)
PROT SERPL-MCNC: 7.2 G/DL (ref 6.4–8.2)
PSA SERPL DL<=0.01 NG/ML-MCNC: 0.83 NG/ML (ref 0–4)
SODIUM SERPL-SCNC: 140 MMOL/L (ref 136–145)
TRIGL SERPL-MCNC: 109 MG/DL (ref 0–150)
URATE SERPL-MCNC: 9.7 MG/DL (ref 3.5–7.2)
VLDLC SERPL CALC-MCNC: 22 MG/DL

## 2025-04-28 PROCEDURE — 3080F DIAST BP >= 90 MM HG: CPT | Performed by: NURSE PRACTITIONER

## 2025-04-28 PROCEDURE — 99214 OFFICE O/P EST MOD 30 MIN: CPT | Performed by: NURSE PRACTITIONER

## 2025-04-28 PROCEDURE — 36415 COLL VENOUS BLD VENIPUNCTURE: CPT | Performed by: NURSE PRACTITIONER

## 2025-04-28 PROCEDURE — 3077F SYST BP >= 140 MM HG: CPT | Performed by: NURSE PRACTITIONER

## 2025-04-28 PROCEDURE — G2211 COMPLEX E/M VISIT ADD ON: HCPCS | Performed by: NURSE PRACTITIONER

## 2025-04-28 RX ORDER — ATORVASTATIN CALCIUM 10 MG/1
10 TABLET, FILM COATED ORAL DAILY
Qty: 90 TABLET | Refills: 1 | Status: SHIPPED | OUTPATIENT
Start: 2025-04-28

## 2025-04-28 RX ORDER — LISINOPRIL 5 MG/1
5 TABLET ORAL DAILY
Qty: 30 TABLET | Refills: 0 | Status: SHIPPED | OUTPATIENT
Start: 2025-04-28

## 2025-04-28 SDOH — ECONOMIC STABILITY: FOOD INSECURITY: WITHIN THE PAST 12 MONTHS, YOU WORRIED THAT YOUR FOOD WOULD RUN OUT BEFORE YOU GOT MONEY TO BUY MORE.: NEVER TRUE

## 2025-04-28 SDOH — ECONOMIC STABILITY: FOOD INSECURITY: WITHIN THE PAST 12 MONTHS, THE FOOD YOU BOUGHT JUST DIDN'T LAST AND YOU DIDN'T HAVE MONEY TO GET MORE.: NEVER TRUE

## 2025-04-28 ASSESSMENT — PATIENT HEALTH QUESTIONNAIRE - PHQ9
SUM OF ALL RESPONSES TO PHQ QUESTIONS 1-9: 0
SUM OF ALL RESPONSES TO PHQ QUESTIONS 1-9: 0
2. FEELING DOWN, DEPRESSED OR HOPELESS: NOT AT ALL
1. LITTLE INTEREST OR PLEASURE IN DOING THINGS: NOT AT ALL
SUM OF ALL RESPONSES TO PHQ QUESTIONS 1-9: 0
SUM OF ALL RESPONSES TO PHQ QUESTIONS 1-9: 0

## 2025-04-28 ASSESSMENT — ENCOUNTER SYMPTOMS
ABDOMINAL PAIN: 1
VOMITING: 0
NAUSEA: 0
SHORTNESS OF BREATH: 0

## 2025-04-28 NOTE — PATIENT INSTRUCTIONS
Ultrasound of gallbladder and thyroid  Avoid fatty, fried foods   Start lisinopril for blood pressure and follow up in one week   Resume the atorvastatin 10 mg

## 2025-04-28 NOTE — PROGRESS NOTES
Date: 4/28/2025                                               Subjective/Objective:     Chief Complaint   Patient presents with    Abdominal Pain     Pain after eating since last week       HPI    Rashad Diaz is a 61 yo male, visit today for c/o abdominal pain. This problem began 4-5 days ago. This started after eating a cheeseburger and fries. The pain began about a half hour later, located RUQ. Describes the pain as throbbing pain, rates it as severe. This resolved after a few hours. No nausea or vomiting. Had a mild episode two nights later, had eaten pulled pork and pasta salad. Has been eating bland foods (oatmeal) and drinking water and has not had any further symptoms. Has never had this before.      Hypertension - on amlodipine previously - he self discontinued this about 6 months because he felt like this was causing fatigue. He stopped HCTZ. Does monitor his blood pressure at home. Denies chest pain, SOB.     CVA 4/2021 completed DAPT. 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 He self discontinued the plavix.      Hyperlipidemia - he stopped atorvastatin due to side effects.  Feels like he tolerated the 10 mg well in the past, has not taken in about 6 months.     Hyperuricemia -  taking 100 mg allopurinol daily.      He had incidental finding of left thyroid nodule with normal TSH, he had Thyroid US and CONDE uptake scan ordered on discharge that has not been completed.     Age/Gender Health Maintenance     Colon Cancer Screening - colonoscopy at  over 10 years, normal ago per patient - has referral. Father had colon CA.  Prostate cancer screening - 2/2024         Patient Active Problem List    Diagnosis Date Noted    Cerebrovascular accident (HCC) 02/21/2023    Vitamin D deficiency 02/02/2022    Cerebrovascular accident (CVA) associated with severe acute respiratory syndrome coronavirus 2 (SARS-CoV-2) infection (HCC) 04/23/2021    HTN (hypertension) 04/22/2021

## 2025-04-30 ENCOUNTER — HOSPITAL ENCOUNTER (OUTPATIENT)
Dept: ULTRASOUND IMAGING | Age: 60
Discharge: HOME | End: 2025-04-30
Payer: MEDICAID

## 2025-04-30 ENCOUNTER — TELEPHONE (OUTPATIENT)
Dept: INTERNAL MEDICINE CLINIC | Age: 60
End: 2025-04-30

## 2025-04-30 ENCOUNTER — RESULTS FOLLOW-UP (OUTPATIENT)
Dept: INTERNAL MEDICINE CLINIC | Age: 60
End: 2025-04-30

## 2025-04-30 DIAGNOSIS — R10.11 RUQ ABDOMINAL PAIN: ICD-10-CM

## 2025-04-30 DIAGNOSIS — K80.50 CALCULUS OF BILE DUCT WITHOUT CHOLECYSTITIS AND WITHOUT OBSTRUCTION: Primary | ICD-10-CM

## 2025-04-30 DIAGNOSIS — R79.89 ELEVATED LFTS: ICD-10-CM

## 2025-04-30 DIAGNOSIS — E04.1 THYROID NODULE: ICD-10-CM

## 2025-04-30 LAB — LIPASE SERPL-CCNC: 26 U/L (ref 13–60)

## 2025-04-30 PROCEDURE — 76536 US EXAM OF HEAD AND NECK: CPT

## 2025-04-30 PROCEDURE — 76705 ECHO EXAM OF ABDOMEN: CPT

## 2025-04-30 NOTE — RESULT ENCOUNTER NOTE
Call placed to patient regarding US results. Pt is having less pain but, worsens with eating. Discussed labs and need for patient to follow with GI urgently. Referral was placed and call out to GI for urgent appointment. Will contact patient once appointment is made. Instructed to follow at the ER for any worsening symptoms/ worsening pain, fever, or vomiting. Pt is agreeable to plan at this time. Will reach as soon as appointment is made.

## 2025-04-30 NOTE — TELEPHONE ENCOUNTER
Called to give patient referral information.    He is scheduled for 5/9/25 at 10:20am, arrival time is 10am.    Address  [address] [address]    He is going to call back

## 2025-05-01 ENCOUNTER — LAB (OUTPATIENT)
Dept: INTERNAL MEDICINE CLINIC | Age: 60
End: 2025-05-01
Payer: MEDICAID

## 2025-05-01 DIAGNOSIS — R79.89 ELEVATED LFTS: ICD-10-CM

## 2025-05-01 DIAGNOSIS — R10.11 RUQ ABDOMINAL PAIN: ICD-10-CM

## 2025-05-01 LAB
ALBUMIN SERPL-MCNC: 4.4 G/DL (ref 3.4–5)
ALP SERPL-CCNC: 114 U/L (ref 40–129)
ALT SERPL-CCNC: 107 U/L (ref 10–40)
AST SERPL-CCNC: 37 U/L (ref 15–37)
BILIRUB DIRECT SERPL-MCNC: 0.5 MG/DL (ref 0–0.3)
BILIRUB INDIRECT SERPL-MCNC: 0.6 MG/DL (ref 0–1)
BILIRUB SERPL-MCNC: 1.1 MG/DL (ref 0–1)
PROT SERPL-MCNC: 7 G/DL (ref 6.4–8.2)

## 2025-05-01 PROCEDURE — 36415 COLL VENOUS BLD VENIPUNCTURE: CPT | Performed by: NURSE PRACTITIONER

## 2025-05-02 ENCOUNTER — RESULTS FOLLOW-UP (OUTPATIENT)
Dept: INTERNAL MEDICINE CLINIC | Age: 60
End: 2025-05-02

## 2025-05-06 ENCOUNTER — TELEPHONE (OUTPATIENT)
Dept: INTERNAL MEDICINE CLINIC | Age: 60
End: 2025-05-06

## 2025-05-06 NOTE — TELEPHONE ENCOUNTER
Patient came in 20 minutes late for his appointment , and I asked him to reschedule , he said he didn't want to reschedule. Told me that it is ok for us to be late , but it's not ok for him , and walked out and didn't want to reschedule.

## 2025-07-01 NOTE — PROGRESS NOTES
Rashad Diaz (:  1965) is a 60 y.o. male,Established patient, here for evaluation of the following chief complaint(s): Pre-op Exam (Gallstone; 25; [facility]/Been out of bp meds x month)    Rashad Delgado presents to the office today for a pre op examination for Gallstone removal.     He presents to the office for refill of his blood pressure medication and pre op examination. Reports he has been out of his blood pressure medication for about a month. Has history of non compliance. Has tried both amlodipine and HCTZ in the past but, took himself off of both due to side effects. Denies headache, dizziness, shortness of breath, peripheral edema, or chest pain. Unclear why he was unable to get refills. Previously tolerated lisinopril. Does not monitor blood pressures at home.     He has past medical history as noted below    Active Ambulatory Problems     Diagnosis Date Noted    HTN (hypertension) 2021    Cerebrovascular accident (CVA) associated with severe acute respiratory syndrome coronavirus 2 (SARS-CoV-2) infection (HCC) 2021    Allergic rhinitis 2012    Essential hypertension, benign 2015    Gallstones 2014    Gout, arthritis 2015    Hyperuricemia 2015    Lichenification and lichen simplex chronicus 2014    Mixed hyperlipidemia 2015    Seborrheic dermatitis 2014    Vitamin D deficiency 2022    Cerebrovascular accident (HCC) 2023     Resolved Ambulatory Problems     Diagnosis Date Noted    No Resolved Ambulatory Problems     Past Medical History:   Diagnosis Date    Cerebrovascular disease     CVA (cerebral vascular accident) (HCC)     Gout     Hyperlipidemia     Hypertension      Vitals:    25 0844   BP: (!) 172/110   Pulse:    SpO2:      Lab Results   Component Value Date    WBC 7.2 2021    HGB 15.3 2021    HCT 45.8 2021    MCV 87.1 2021     2021     Lab Results   Component Value Date

## 2025-07-02 ENCOUNTER — OFFICE VISIT (OUTPATIENT)
Dept: INTERNAL MEDICINE CLINIC | Age: 60
End: 2025-07-02
Payer: MEDICAID

## 2025-07-02 VITALS
DIASTOLIC BLOOD PRESSURE: 110 MMHG | WEIGHT: 245.8 LBS | HEART RATE: 96 BPM | BODY MASS INDEX: 34.28 KG/M2 | SYSTOLIC BLOOD PRESSURE: 172 MMHG | OXYGEN SATURATION: 98 %

## 2025-07-02 DIAGNOSIS — I10 HYPERTENSION, UNSPECIFIED TYPE: ICD-10-CM

## 2025-07-02 PROCEDURE — 36415 COLL VENOUS BLD VENIPUNCTURE: CPT | Performed by: STUDENT IN AN ORGANIZED HEALTH CARE EDUCATION/TRAINING PROGRAM

## 2025-07-02 PROCEDURE — 99213 OFFICE O/P EST LOW 20 MIN: CPT | Performed by: STUDENT IN AN ORGANIZED HEALTH CARE EDUCATION/TRAINING PROGRAM

## 2025-07-02 PROCEDURE — 3080F DIAST BP >= 90 MM HG: CPT | Performed by: STUDENT IN AN ORGANIZED HEALTH CARE EDUCATION/TRAINING PROGRAM

## 2025-07-02 PROCEDURE — 3077F SYST BP >= 140 MM HG: CPT | Performed by: STUDENT IN AN ORGANIZED HEALTH CARE EDUCATION/TRAINING PROGRAM

## 2025-07-02 RX ORDER — LISINOPRIL 5 MG/1
5 TABLET ORAL DAILY
Qty: 30 TABLET | Refills: 1 | Status: SHIPPED | OUTPATIENT
Start: 2025-07-02 | End: 2025-08-31

## 2025-07-02 NOTE — ASSESSMENT & PLAN NOTE
Chronic, not at goal (unstable), continue current treatment plan  - Will defer pre op this time as patient blood pressure is not well controlled for procedure. Pt will call to postpone procedure. Will restart lisinopril. Encouraged to monitor at home and follow in 2-4 weeks for evaluation.  - Encouraged to schedule for routine eye exam.  - Will evaluate CMP.    -There have been some medication compliance issues here. I have discussed with him the great importance of following the treatment plan exactly as directed in order to achieve a good medical outcome.   Orders:    lisinopril (PRINIVIL;ZESTRIL) 5 MG tablet; Take 1 tablet by mouth daily    MORALES - Isael Urban MD, Ophthalmology (Comprehensive), [facility] [place]    Comprehensive Metabolic Panel, Fasting    Hepatic Function Panel    Hemoglobin A1C

## 2025-07-09 ASSESSMENT — ENCOUNTER SYMPTOMS
DIARRHEA: 0
NAUSEA: 0
ABDOMINAL PAIN: 0
VOMITING: 0
COUGH: 0
SHORTNESS OF BREATH: 0
CONSTIPATION: 0
PHOTOPHOBIA: 0

## 2025-07-17 ENCOUNTER — HOSPITAL ENCOUNTER (OUTPATIENT)
Age: 60
Discharge: HOME | End: 2025-07-17
Payer: MEDICAID

## 2025-07-17 ENCOUNTER — OFFICE VISIT (OUTPATIENT)
Dept: INTERNAL MEDICINE CLINIC | Age: 60
End: 2025-07-17
Payer: MEDICAID

## 2025-07-17 VITALS
SYSTOLIC BLOOD PRESSURE: 148 MMHG | BODY MASS INDEX: 34.72 KG/M2 | HEIGHT: 71 IN | DIASTOLIC BLOOD PRESSURE: 118 MMHG | OXYGEN SATURATION: 96 % | WEIGHT: 248 LBS | HEART RATE: 61 BPM

## 2025-07-17 DIAGNOSIS — K80.20 CALCULUS OF GALLBLADDER WITHOUT CHOLECYSTITIS WITHOUT OBSTRUCTION: ICD-10-CM

## 2025-07-17 DIAGNOSIS — I10 HYPERTENSION, UNSPECIFIED TYPE: ICD-10-CM

## 2025-07-17 DIAGNOSIS — E78.2 MIXED HYPERLIPIDEMIA: ICD-10-CM

## 2025-07-17 DIAGNOSIS — I10 HYPERTENSION, UNSPECIFIED TYPE: Primary | ICD-10-CM

## 2025-07-17 LAB
EKG ATRIAL RATE: 82 BPM
EKG DIAGNOSIS: NORMAL
EKG P AXIS: 47 DEGREES
EKG P-R INTERVAL: 172 MS
EKG Q-T INTERVAL: 400 MS
EKG QRS DURATION: 96 MS
EKG QTC CALCULATION (BAZETT): 467 MS
EKG R AXIS: -24 DEGREES
EKG T AXIS: 59 DEGREES
EKG VENTRICULAR RATE: 82 BPM

## 2025-07-17 PROCEDURE — 93005 ELECTROCARDIOGRAM TRACING: CPT

## 2025-07-17 PROCEDURE — 3077F SYST BP >= 140 MM HG: CPT | Performed by: NURSE PRACTITIONER

## 2025-07-17 PROCEDURE — 36415 COLL VENOUS BLD VENIPUNCTURE: CPT | Performed by: NURSE PRACTITIONER

## 2025-07-17 PROCEDURE — 93000 ELECTROCARDIOGRAM COMPLETE: CPT | Performed by: NURSE PRACTITIONER

## 2025-07-17 PROCEDURE — 3080F DIAST BP >= 90 MM HG: CPT | Performed by: NURSE PRACTITIONER

## 2025-07-17 PROCEDURE — 99214 OFFICE O/P EST MOD 30 MIN: CPT | Performed by: NURSE PRACTITIONER

## 2025-07-17 PROCEDURE — 93010 ELECTROCARDIOGRAM REPORT: CPT | Performed by: INTERNAL MEDICINE

## 2025-07-17 RX ORDER — AMLODIPINE BESYLATE 5 MG/1
TABLET ORAL
COMMUNITY
End: 2025-07-17

## 2025-07-17 RX ORDER — LISINOPRIL 20 MG/1
20 TABLET ORAL DAILY
Qty: 30 TABLET | Refills: 0 | Status: SHIPPED | OUTPATIENT
Start: 2025-07-17 | End: 2025-08-16

## 2025-07-17 RX ORDER — HYDROCHLOROTHIAZIDE 25 MG/1
TABLET ORAL
COMMUNITY
End: 2025-07-17

## 2025-07-17 ASSESSMENT — ENCOUNTER SYMPTOMS: SHORTNESS OF BREATH: 0

## 2025-07-17 NOTE — PROGRESS NOTES
Date: 7/18/2025                                               Subjective/Objective:     Chief Complaint   Patient presents with    Follow-up     EKG; htn meds       HPI    Rashad Diaz is a 61 yo male, visit today for follow up on hypertension. He was seen yesterday with asymptomatic severely elevated blood pressure. His lisinopril was increased to 20 mg, he presents today for follow up. He denies chest pain, SOB and HA.     Patient Active Problem List    Diagnosis Date Noted    Cerebrovascular accident (HCC) 02/21/2023    Vitamin D deficiency 02/02/2022    Cerebrovascular accident (CVA) associated with severe acute respiratory syndrome coronavirus 2 (SARS-CoV-2) infection (HCC) 04/23/2021    HTN (hypertension) 04/22/2021    Mixed hyperlipidemia 08/26/2015    Gout, arthritis 08/05/2015    Essential hypertension, benign 04/23/2015    Hyperuricemia 04/23/2015    Lichenification and lichen simplex chronicus 02/18/2014    Seborrheic dermatitis 02/18/2014    Gallstones 02/04/2014    Allergic rhinitis 07/24/2012       Past Medical History:   Diagnosis Date    Cerebrovascular disease     CVA (cerebral vascular accident) (HCC)     Gout     Hyperlipidemia     Hypertension        No past surgical history on file.    Hospital Outpatient Visit on 07/17/2025   Component Date Value Ref Range Status    Ventricular Rate 07/17/2025 82  BPM Final    Atrial Rate 07/17/2025 82  BPM Final    P-R Interval 07/17/2025 172  ms Final    QRS Duration 07/17/2025 96  ms Final    Q-T Interval 07/17/2025 400  ms Final    QTc Calculation (Bazett) 07/17/2025 467  ms Final    P Axis 07/17/2025 47  degrees Final    R Axis 07/17/2025 -24  degrees Final    T Axis 07/17/2025 59  degrees Final    Diagnosis 07/17/2025 Normal sinus rhythmConfirmed by ROLO CONKLIN ([id number]) on 7/17/2025 3:22:27 PM   Final       Family History   Problem Relation Age of Onset    Cancer Father         colon cancer       Current Outpatient Medications   Medication Sig

## 2025-07-17 NOTE — PROGRESS NOTES
Date: 7/17/2025                                               Subjective/Objective:     Chief Complaint   Patient presents with    Hypertension    Follow-up       HPI    Rashad Diaz is a 59 yo male, visit today for follow up on hypertension. He denies concerns today.    He is planning to have laparoscopic cholecystectomy, cancelled due to uncontrolled hypertension.    Hypertension - on lisinopril. Historically poor compliance with medications. Denies chest pain, SOB, headaches.   Does feel a little fatigued since starting the lisinopril, no other side effects.   Endorses medication compliance, denies any missed doses over the last two weeks.      CVA 4/2021 completed DAPT. 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 He self discontinued the plavix.      Hyperlipidemia - previously on atorvastatin. He has self discontinued this.      Hyperuricemia -  on 100 mg allopurinol daily.       Left thyroid nodule due for US 4/2026     Age/Gender Health Maintenance     Colon Cancer Screening - colonoscopy at  over 10 years, normal ago per patient - has referral. Father had colon CA.  Prostate cancer screening - 4/2025            Patient Active Problem List    Diagnosis Date Noted    Cerebrovascular accident (HCC) 02/21/2023    Vitamin D deficiency 02/02/2022    Cerebrovascular accident (CVA) associated with severe acute respiratory syndrome coronavirus 2 (SARS-CoV-2) infection (HCC) 04/23/2021    HTN (hypertension) 04/22/2021    Mixed hyperlipidemia 08/26/2015    Gout, arthritis 08/05/2015    Essential hypertension, benign 04/23/2015    Hyperuricemia 04/23/2015    Lichenification and lichen simplex chronicus 02/18/2014    Seborrheic dermatitis 02/18/2014    Gallstones 02/04/2014    Allergic rhinitis 07/24/2012       Past Medical History:   Diagnosis Date    Cerebrovascular disease     CVA (cerebral vascular accident) (HCC)     Gout     Hyperlipidemia     Hypertension        No past

## 2025-07-17 NOTE — PATIENT INSTRUCTIONS
Increase lisinopril to 20 mg  Return tomorrow for check  Go to ER for any chest pain, trouble breathing, dizziness or headaches

## 2025-07-18 ENCOUNTER — OFFICE VISIT (OUTPATIENT)
Dept: INTERNAL MEDICINE CLINIC | Age: 60
End: 2025-07-18
Payer: MEDICAID

## 2025-07-18 VITALS
WEIGHT: 247 LBS | BODY MASS INDEX: 34.47 KG/M2 | OXYGEN SATURATION: 97 % | DIASTOLIC BLOOD PRESSURE: 100 MMHG | HEART RATE: 95 BPM | SYSTOLIC BLOOD PRESSURE: 150 MMHG

## 2025-07-18 DIAGNOSIS — I10 ESSENTIAL HYPERTENSION, BENIGN: Primary | ICD-10-CM

## 2025-07-18 PROCEDURE — 3077F SYST BP >= 140 MM HG: CPT | Performed by: NURSE PRACTITIONER

## 2025-07-18 PROCEDURE — 99214 OFFICE O/P EST MOD 30 MIN: CPT | Performed by: NURSE PRACTITIONER

## 2025-07-18 PROCEDURE — 3080F DIAST BP >= 90 MM HG: CPT | Performed by: NURSE PRACTITIONER

## 2025-07-18 ASSESSMENT — ENCOUNTER SYMPTOMS: SHORTNESS OF BREATH: 0

## 2025-07-29 NOTE — PROGRESS NOTES
MG tablet; Take 1 tablet by mouth daily  Dispense: 90 tablet; Refill: 0   - Pt reports home readings show SBP around 140.    - Increase lisinopril to 40 mg daily   - Follow up two weeks, bring home BP cuff and home BP readings. Follow up sooner if needed.     2. Sleep apnea-like behavior  - Jeremiah Puente MD, Sleep Medicine, [facility]      Orders Placed This Encounter   Procedures    Jeremiah Puente MD, Sleep Medicine, [facility]     Referral Priority:   Routine     Referral Type:   Eval and Treat     Referral Reason:   Specialty Services Required     Referred to Provider:   Jeremiah Hannah MD     Requested Specialty:   Sleep Medicine     Number of Visits Requested:   1       Return in about 2 weeks (around 8/14/2025) for hypertension. OR sooner with questions, concerns, worsening symptoms    RELL INGRAM  7/31/2025  9:04 AM    Discussed use, benefit, and side effects of prescribed medications.  Barriers to medication compliance addressed.  Discussed all ordered testing and labs. All patient questions answered. Patient agreeable with plan above.     Please note that this chart was generated using dragon dictation software.  Although every effort was made to ensure the accuracy of this automated transcription, some errors in transcription may have occurred.

## 2025-07-31 ENCOUNTER — OFFICE VISIT (OUTPATIENT)
Dept: INTERNAL MEDICINE CLINIC | Age: 60
End: 2025-07-31
Payer: MEDICAID

## 2025-07-31 VITALS
HEART RATE: 95 BPM | SYSTOLIC BLOOD PRESSURE: 164 MMHG | WEIGHT: 248 LBS | OXYGEN SATURATION: 95 % | DIASTOLIC BLOOD PRESSURE: 116 MMHG | HEIGHT: 71 IN | BODY MASS INDEX: 34.72 KG/M2

## 2025-07-31 DIAGNOSIS — I10 HYPERTENSION, UNSPECIFIED TYPE: Primary | ICD-10-CM

## 2025-07-31 DIAGNOSIS — G47.39 SLEEP APNEA-LIKE BEHAVIOR: ICD-10-CM

## 2025-07-31 PROCEDURE — 3077F SYST BP >= 140 MM HG: CPT | Performed by: NURSE PRACTITIONER

## 2025-07-31 PROCEDURE — 99214 OFFICE O/P EST MOD 30 MIN: CPT | Performed by: NURSE PRACTITIONER

## 2025-07-31 PROCEDURE — 3080F DIAST BP >= 90 MM HG: CPT | Performed by: NURSE PRACTITIONER

## 2025-07-31 RX ORDER — LISINOPRIL 40 MG/1
40 TABLET ORAL DAILY
Qty: 90 TABLET | Refills: 0 | Status: SHIPPED | OUTPATIENT
Start: 2025-07-31 | End: 2025-10-29

## 2025-07-31 ASSESSMENT — ENCOUNTER SYMPTOMS: SHORTNESS OF BREATH: 0

## 2025-07-31 NOTE — PATIENT INSTRUCTIONS
See sleep doctor  Increase lisinopril to 40 mg daily  Follow up in two weeks, bring your blood pressure cuff from home and bring a record of home blood pressure readings

## 2025-08-18 ENCOUNTER — OFFICE VISIT (OUTPATIENT)
Dept: INTERNAL MEDICINE CLINIC | Age: 60
End: 2025-08-18
Payer: MEDICAID

## 2025-08-18 VITALS
OXYGEN SATURATION: 96 % | BODY MASS INDEX: 34.86 KG/M2 | HEIGHT: 71 IN | SYSTOLIC BLOOD PRESSURE: 150 MMHG | WEIGHT: 249 LBS | HEART RATE: 86 BPM | DIASTOLIC BLOOD PRESSURE: 110 MMHG

## 2025-08-18 DIAGNOSIS — I10 ESSENTIAL HYPERTENSION, BENIGN: Primary | ICD-10-CM

## 2025-08-18 PROCEDURE — 3077F SYST BP >= 140 MM HG: CPT | Performed by: NURSE PRACTITIONER

## 2025-08-18 PROCEDURE — 99214 OFFICE O/P EST MOD 30 MIN: CPT | Performed by: NURSE PRACTITIONER

## 2025-08-18 PROCEDURE — 3080F DIAST BP >= 90 MM HG: CPT | Performed by: NURSE PRACTITIONER

## 2025-08-18 RX ORDER — NIFEDIPINE 60 MG/1
60 TABLET, EXTENDED RELEASE ORAL DAILY
Qty: 30 TABLET | Refills: 0 | Status: SHIPPED | OUTPATIENT
Start: 2025-08-18

## 2025-08-18 SDOH — ECONOMIC STABILITY: FOOD INSECURITY: WITHIN THE PAST 12 MONTHS, YOU WORRIED THAT YOUR FOOD WOULD RUN OUT BEFORE YOU GOT MONEY TO BUY MORE.: NEVER TRUE

## 2025-08-18 SDOH — ECONOMIC STABILITY: FOOD INSECURITY: WITHIN THE PAST 12 MONTHS, THE FOOD YOU BOUGHT JUST DIDN'T LAST AND YOU DIDN'T HAVE MONEY TO GET MORE.: NEVER TRUE

## 2025-08-18 ASSESSMENT — PATIENT HEALTH QUESTIONNAIRE - PHQ9
1. LITTLE INTEREST OR PLEASURE IN DOING THINGS: NOT AT ALL
SUM OF ALL RESPONSES TO PHQ QUESTIONS 1-9: 0
2. FEELING DOWN, DEPRESSED OR HOPELESS: NOT AT ALL

## 2025-08-18 ASSESSMENT — ENCOUNTER SYMPTOMS: SHORTNESS OF BREATH: 0

## 2025-08-28 ENCOUNTER — OFFICE VISIT (OUTPATIENT)
Dept: SLEEP MEDICINE | Age: 60
End: 2025-08-28
Payer: MEDICAID

## 2025-08-28 VITALS
HEART RATE: 93 BPM | TEMPERATURE: 98.2 F | WEIGHT: 245.4 LBS | DIASTOLIC BLOOD PRESSURE: 100 MMHG | HEIGHT: 71 IN | SYSTOLIC BLOOD PRESSURE: 145 MMHG | BODY MASS INDEX: 34.35 KG/M2 | OXYGEN SATURATION: 99 % | RESPIRATION RATE: 18 BRPM

## 2025-08-28 DIAGNOSIS — R06.89 GASPING FOR BREATH: ICD-10-CM

## 2025-08-28 DIAGNOSIS — E66.811 OBESITY (BMI 30.0-34.9): ICD-10-CM

## 2025-08-28 DIAGNOSIS — G47.33 OSA (OBSTRUCTIVE SLEEP APNEA): Primary | ICD-10-CM

## 2025-08-28 DIAGNOSIS — G47.19 EXCESSIVE DAYTIME SLEEPINESS: ICD-10-CM

## 2025-08-28 DIAGNOSIS — R06.83 SNORING: ICD-10-CM

## 2025-08-28 DIAGNOSIS — I10 ESSENTIAL HYPERTENSION, BENIGN: ICD-10-CM

## 2025-08-28 PROCEDURE — G2211 COMPLEX E/M VISIT ADD ON: HCPCS | Performed by: PSYCHIATRY & NEUROLOGY

## 2025-08-28 PROCEDURE — 3080F DIAST BP >= 90 MM HG: CPT | Performed by: PSYCHIATRY & NEUROLOGY

## 2025-08-28 PROCEDURE — 3077F SYST BP >= 140 MM HG: CPT | Performed by: PSYCHIATRY & NEUROLOGY

## 2025-08-28 PROCEDURE — 99204 OFFICE O/P NEW MOD 45 MIN: CPT | Performed by: PSYCHIATRY & NEUROLOGY

## 2025-08-28 ASSESSMENT — ENCOUNTER SYMPTOMS
CHOKING: 1
EYES NEGATIVE: 1
ALLERGIC/IMMUNOLOGIC NEGATIVE: 1
GASTROINTESTINAL NEGATIVE: 1

## 2025-08-28 ASSESSMENT — SLEEP AND FATIGUE QUESTIONNAIRES
HOW LIKELY ARE YOU TO NOD OFF OR FALL ASLEEP WHILE SITTING AND TALKING TO SOMEONE: WOULD NEVER DOZE
ESS TOTAL SCORE: 10
HOW LIKELY ARE YOU TO NOD OFF OR FALL ASLEEP WHILE WATCHING TV: MODERATE CHANCE OF DOZING
HOW LIKELY ARE YOU TO NOD OFF OR FALL ASLEEP WHEN YOU ARE A PASSENGER IN A CAR FOR AN HOUR WITHOUT A BREAK: WOULD NEVER DOZE
HOW LIKELY ARE YOU TO NOD OFF OR FALL ASLEEP WHILE SITTING INACTIVE IN A PUBLIC PLACE: WOULD NEVER DOZE
HOW LIKELY ARE YOU TO NOD OFF OR FALL ASLEEP WHILE LYING DOWN TO REST IN THE AFTERNOON WHEN CIRCUMSTANCES PERMIT: HIGH CHANCE OF DOZING
HOW LIKELY ARE YOU TO NOD OFF OR FALL ASLEEP IN A CAR, WHILE STOPPED FOR A FEW MINUTES IN TRAFFIC: WOULD NEVER DOZE
HOW LIKELY ARE YOU TO NOD OFF OR FALL ASLEEP WHILE SITTING QUIETLY AFTER LUNCH WITHOUT ALCOHOL: MODERATE CHANCE OF DOZING
HOW LIKELY ARE YOU TO NOD OFF OR FALL ASLEEP WHILE SITTING AND READING: HIGH CHANCE OF DOZING